# Patient Record
Sex: MALE | Race: WHITE | NOT HISPANIC OR LATINO | Employment: FULL TIME | URBAN - METROPOLITAN AREA
[De-identification: names, ages, dates, MRNs, and addresses within clinical notes are randomized per-mention and may not be internally consistent; named-entity substitution may affect disease eponyms.]

---

## 2017-01-11 ENCOUNTER — TELEPHONE (OUTPATIENT)
Dept: FAMILY MEDICINE | Facility: OTHER | Age: 52
End: 2017-01-11

## 2017-01-11 NOTE — Clinical Note
[address]   [facility] [address]  Phone: [phone]  January 16, 2017      Jack Lenz  [address]  [facility] [address]      Dear Jack,    We care about your health and have reviewed your health plan including your medical conditions, medications, and lab results.  Based on this review, it is recommended that you follow up regarding the following health topic(s):  -Cholesterol  -High Blood Pressure  -Colon Cancer Screening    We recommend you take the following action(s):  -schedule a FREE FLOAT MA-ONLY BLOOD PRESSURE APPOINTMENT within the next 1-4 weeks.  -schedule a LAB ONLY APPOINTMENT to recheck your: Lipids (fasting cholesterol - nothing to eat except water and/or meds for 8-10 hours) and BMP (basic metabolic panel) within the next 1-4 weeks.  If' you have had labs completed eslewhere, please let us know so we can update your records.    -schedule a COLONOSCOPY to look for colon cancer (due every 10 years or 5 years in higher risk situations.)  Colonoscopies can prevent 90-95% of colon cancer deaths.  Problem lesions can be removed before they ever become cancer.  If you do not wish to do a colonoscopy or cannot afford to do one at this time, there is another option called a Fecal Immunochemical Occult Blood Test (FIT) a take home stool sample kit.  It does not replace the colonoscopy for colorectal cancer screening, but it can detect hidden bleeding in the lower colon.  It does need to be repeated every year and if a positive result is obtained, you would be referred for a colonoscopy.  If you have completed either one of these tests at another facility, please have the records sent to our clinic for our records.     Please call us at the [facility] - [phone] (or use [employer]) to address the above recommendations.     Thank you for trusting [facility] and we appreciate the opportunity to serve you.  We look forward to  supporting your healthcare needs in the future.    Healthy Regards,    Your Health Care Team  [facility]

## 2017-01-11 NOTE — TELEPHONE ENCOUNTER
Summary:    Patient is due/failing the following:   BMP, BP CHECK, COLONOSCOPY and LDL    Action needed:   Patient needs fasting lab only appointment, Patient needs nurse only appointment. and schedule a colonoscopy     Type of outreach:    Phone, left message for patient to call back.     Questions for provider review:    None                                                                                                                                    Brianna Foster       Chart routed to Care Team .        Panel Management Review      Patient has the following on his problem list:     Hypertension   Last three blood pressure readings:  BP Readings from Last 3 Encounters:   08/01/16 138/94   06/02/15 130/88   04/14/14 134/82     Blood pressure: Failed    HTN Guidelines:  Age 18-59 BP range:  Less than 140/90  Age 60-85 with Diabetes:  Less than 140/90  Age 60-85 without Diabetes:  less than 150/90      Composite cancer screening  Chart review shows that this patient is due/due soon for the following Colonoscopy

## 2017-05-09 ENCOUNTER — TELEPHONE (OUTPATIENT)
Dept: FAMILY MEDICINE | Facility: OTHER | Age: 52
End: 2017-05-09

## 2017-05-09 NOTE — LETTER
[address]   [facility] [address]  Phone: [phone]  May 31, 2017      Jack Lenz  [address]  [facility] [address]      Dear Jack,    We care about your health and have reviewed your health plan including your medical conditions, medications, and lab results.  Based on this review, it is recommended that you follow up regarding the following health topic(s):  -Cholesterol  -High Blood Pressure  -Colon Cancer Screening    We recommend you take the following action(s):  -schedule a FREE FLOAT MA-ONLY BLOOD PRESSURE APPOINTMENT within the next 1-4 weeks.  -schedule a LAB ONLY APPOINTMENT to recheck your: Lipids (fasting cholesterol - nothing to eat except water and/or meds for 8-10 hours) and BMP (basic metabolic panel) within the next 1-4 weeks.  If' you have had labs completed eslewhere, please let us know so we can update your records.    -schedule a COLONOSCOPY to look for colon cancer (due every 10 years or 5 years in higher risk situations.)  Colonoscopies can prevent 90-95% of colon cancer deaths.  Problem lesions can be removed before they ever become cancer.  If you do not wish to do a colonoscopy or cannot afford to do one at this time, there is another option called a Fecal Immunochemical Occult Blood Test (FIT) a take home stool sample kit.  It does not replace the colonoscopy for colorectal cancer screening, but it can detect hidden bleeding in the lower colon.  It does need to be repeated every year and if a positive result is obtained, you would be referred for a colonoscopy.  If you have completed either one of these tests at another facility, please have the records sent to our clinic for our records.     Please call us at the [facility] - [phone] (or use [employer]) to address the above recommendations.     Thank you for trusting [facility] and we appreciate the opportunity to serve you.  We look forward to  supporting your healthcare needs in the future.    Healthy Regards,    Your Health Care Team  [facility]

## 2017-05-09 NOTE — TELEPHONE ENCOUNTER
Summary:    Patient is due/failing the following:   BMP, BP CHECK, COLONOSCOPY and LDL    Action needed:   Patient needs fasting lab only appointment, Patient needs nurse only appointment. and schedule a colonoscopy or complete a FIT test     Type of outreach:    Phone, left message for patient to call back.     Questions for provider review:    None                                                                                                                                    Brianna Foster       Chart routed to Care Team .        Panel Management Review      Patient has the following on his problem list:     Hypertension   Last three blood pressure readings:  BP Readings from Last 3 Encounters:   08/01/16 (!) 138/94   06/02/15 130/88   04/14/14 134/82     Blood pressure: FAILED    HTN Guidelines:  Age 18-59 BP range:  Less than 140/90  Age 60-85 with Diabetes:  Less than 140/90  Age 60-85 without Diabetes:  less than 150/90      Composite cancer screening  Chart review shows that this patient is due/due soon for the following Colonoscopy

## 2017-08-08 ENCOUNTER — TELEPHONE (OUTPATIENT)
Dept: FAMILY MEDICINE | Facility: OTHER | Age: 52
End: 2017-08-08

## 2017-08-08 NOTE — LETTER
[address]   [facility] [address]  Phone: [phone]  August 23, 2017      Jack Lenz  [address]  [facility] [address]      Dear Jack,    We care about your health and have reviewed your health plan including your medical conditions, medications, and lab results.  Based on this review, it is recommended that you follow up regarding the following health topic(s):  Fasting lab only appointment, nurse only appointment for blood pressure and schedule colonoscopy     Please call us at the [facility] - [phone] (or use [employer]) to address the above recommendations.     Thank you for trusting [facility] and we appreciate the opportunity to serve you.  We look forward to supporting your healthcare needs in the future.    Healthy Regards,    Your Health Care Team  [facility]

## 2017-08-08 NOTE — TELEPHONE ENCOUNTER
Panel Management Review      Patient has the following on his problem list:     Hypertension   Last three blood pressure readings:  BP Readings from Last 3 Encounters:   08/01/16 (!) 138/94   06/02/15 130/88   04/14/14 134/82     Blood pressure: FAILED    HTN Guidelines:  Age 18-59 BP range:  Less than 140/90  Age 60-85 with Diabetes:  Less than 140/90  Age 60-85 without Diabetes:  less than 150/90        Composite cancer screening  Chart review shows that this patient is due/due soon for the following Colonoscopy  Summary:    Patient is due/failing the following:   BMP, BP CHECK, PSA, COLONOSCOPY and LDL    Action needed:   Patient needs fasting lab only appointment, Patient needs nurse only appointment. and schedule colonoscopy    Type of outreach:    Phone, left message for patient to call back.     Questions for provider review:    None                                                                                                                                    Jazmin Robison CMA     Chart routed to Care Team .

## 2017-08-15 NOTE — TELEPHONE ENCOUNTER
Attempted to reach the patient with the following results:  Left message on voicemail for the patient to call back.   Jenni Monk MA

## 2017-09-12 ENCOUNTER — TELEPHONE (OUTPATIENT)
Dept: FAMILY MEDICINE | Facility: OTHER | Age: 52
End: 2017-09-12

## 2017-09-12 DIAGNOSIS — I10 ESSENTIAL HYPERTENSION WITH GOAL BLOOD PRESSURE LESS THAN 140/90: ICD-10-CM

## 2017-09-12 NOTE — TELEPHONE ENCOUNTER
hydrochlorothiazide (HYDRODIURIL) 25 MG tablet      Last Written Prescription Date: 8/1/16  Last Fill Quantity: 90, # refills: 3  Last Office Visit with [facility] or  Health prescribing provider: 8/1/16 chantale       Potassium   Date Value Ref Range Status   06/02/2015 4.1 3.4 - 5.3 mmol/L Final     Creatinine   Date Value Ref Range Status   06/02/2015 0.85 0.66 - 1.25 mg/dL Final     BP Readings from Last 3 Encounters:   08/01/16 (!) 138/94   06/02/15 130/88   04/14/14 134/82     lisinopril (PRINIVIL,ZESTRIL) 10 MG tablet      Last Written Prescription Date: 8/1/16  Last Fill Quantity: 90, # refills: 3  Last Office Visit with [facility] or  Health prescribing provider: 8/1/16 chantale       Potassium   Date Value Ref Range Status   06/02/2015 4.1 3.4 - 5.3 mmol/L Final     Creatinine   Date Value Ref Range Status   06/02/2015 0.85 0.66 - 1.25 mg/dL Final     BP Readings from Last 3 Encounters:   08/01/16 (!) 138/94   06/02/15 130/88   04/14/14 134/82     simvastatin (ZOCOR) 20 MG tablet     Last Written Prescription Date: 8/1/16  Last Fill Quantity: 90, # refills: 3  Last Office Visit with [facility] or  Health prescribing provider: 8/1/16 chantale       Lab Results   Component Value Date    CHOL 171 06/02/2015     Lab Results   Component Value Date    HDL 46 06/02/2015     Lab Results   Component Value Date    LDL 89 06/02/2015     Lab Results   Component Value Date    TRIG 180 06/02/2015     Lab Results   Component Value Date    CHOLHDLRATIO 3.7 06/02/2015

## 2017-09-13 RX ORDER — SIMVASTATIN 20 MG
TABLET ORAL
Qty: 30 TABLET | Refills: 0 | Status: SHIPPED | OUTPATIENT
Start: 2017-09-13 | End: 2017-11-06

## 2017-09-13 RX ORDER — HYDROCHLOROTHIAZIDE 25 MG/1
TABLET ORAL
Qty: 30 TABLET | Refills: 0 | Status: SHIPPED | OUTPATIENT
Start: 2017-09-13 | End: 2017-11-06

## 2017-09-13 RX ORDER — LISINOPRIL 10 MG/1
TABLET ORAL
Qty: 30 TABLET | Refills: 0 | Status: SHIPPED | OUTPATIENT
Start: 2017-09-13 | End: 2017-11-06

## 2017-09-13 NOTE — TELEPHONE ENCOUNTER
Routing refill request to provider for review/approval because:  Patient needs to be seen because it has been more than 1 year since last office visit.  Appears to be a break in medication - should have been out around 8/1/17    Gaby Roy, RN, BSN

## 2017-09-18 NOTE — TELEPHONE ENCOUNTER
Lm informing patient of message below. That refills have been seen but will need an office visit before next fill.

## 2017-10-20 ENCOUNTER — TELEPHONE (OUTPATIENT)
Dept: FAMILY MEDICINE | Facility: OTHER | Age: 52
End: 2017-10-20

## 2017-10-20 NOTE — TELEPHONE ENCOUNTER
Panel Management Review      Patient has the following on his problem list:     Hypertension   Last three blood pressure readings:  BP Readings from Last 3 Encounters:   08/01/16 (!) 138/94   06/02/15 130/88   04/14/14 134/82     Blood pressure: Passed    HTN Guidelines:  Age 18-59 BP range:  Less than 140/90  Age 60-85 with Diabetes:  Less than 140/90  Age 60-85 without Diabetes:  less than 150/90        Composite cancer screening  Chart review shows that this patient is due/due soon for the following Colonoscopy  Summary:    Patient is due/failing the following:   BMP, COLONOSCOPY and LDL    Action needed:   Patient needs office visit for physical.    Type of outreach:    Phone, spoke to patient.  patient will call to schedule yearly physical    Questions for provider review:    None                                                                                                                                    April MILENA Adamson       Chart routed to Care Team .

## 2017-11-06 ENCOUNTER — OFFICE VISIT (OUTPATIENT)
Dept: FAMILY MEDICINE | Facility: OTHER | Age: 52
End: 2017-11-06
Payer: COMMERCIAL

## 2017-11-06 VITALS
OXYGEN SATURATION: 97 % | TEMPERATURE: 97.1 F | HEART RATE: 69 BPM | HEIGHT: 70 IN | BODY MASS INDEX: 37.8 KG/M2 | WEIGHT: 264 LBS | SYSTOLIC BLOOD PRESSURE: 140 MMHG | DIASTOLIC BLOOD PRESSURE: 92 MMHG | RESPIRATION RATE: 16 BRPM

## 2017-11-06 DIAGNOSIS — E78.5 HYPERLIPIDEMIA LDL GOAL <130: ICD-10-CM

## 2017-11-06 DIAGNOSIS — I10 HYPERTENSION GOAL BP (BLOOD PRESSURE) < 140/90: ICD-10-CM

## 2017-11-06 DIAGNOSIS — Z12.11 COLON CANCER SCREENING: ICD-10-CM

## 2017-11-06 DIAGNOSIS — J30.1 CHRONIC SEASONAL ALLERGIC RHINITIS DUE TO POLLEN: ICD-10-CM

## 2017-11-06 DIAGNOSIS — Z12.5 SCREENING FOR PROSTATE CANCER: ICD-10-CM

## 2017-11-06 DIAGNOSIS — Z00.00 ENCOUNTER FOR ROUTINE ADULT HEALTH EXAMINATION WITHOUT ABNORMAL FINDINGS: Primary | ICD-10-CM

## 2017-11-06 DIAGNOSIS — E66.09 CLASS 2 OBESITY DUE TO EXCESS CALORIES WITHOUT SERIOUS COMORBIDITY WITH BODY MASS INDEX (BMI) OF 37.0 TO 37.9 IN ADULT: ICD-10-CM

## 2017-11-06 DIAGNOSIS — E55.9 VITAMIN D DEFICIENCY: ICD-10-CM

## 2017-11-06 DIAGNOSIS — M17.11 PRIMARY OSTEOARTHRITIS OF RIGHT KNEE: ICD-10-CM

## 2017-11-06 DIAGNOSIS — L57.0 ACTINIC KERATOSES: ICD-10-CM

## 2017-11-06 DIAGNOSIS — E66.812 CLASS 2 OBESITY DUE TO EXCESS CALORIES WITHOUT SERIOUS COMORBIDITY WITH BODY MASS INDEX (BMI) OF 37.0 TO 37.9 IN ADULT: ICD-10-CM

## 2017-11-06 DIAGNOSIS — H93.13 TINNITUS, BILATERAL: ICD-10-CM

## 2017-11-06 LAB
ALBUMIN SERPL-MCNC: 4.3 G/DL (ref 3.4–5)
ALP SERPL-CCNC: 75 U/L (ref 40–150)
ALT SERPL W P-5'-P-CCNC: 46 U/L (ref 0–70)
ANION GAP SERPL CALCULATED.3IONS-SCNC: 8 MMOL/L (ref 3–14)
AST SERPL W P-5'-P-CCNC: 20 U/L (ref 0–45)
BILIRUB SERPL-MCNC: 0.6 MG/DL (ref 0.2–1.3)
BUN SERPL-MCNC: 14 MG/DL (ref 7–30)
CALCIUM SERPL-MCNC: 9.1 MG/DL (ref 8.5–10.1)
CHLORIDE SERPL-SCNC: 105 MMOL/L (ref 94–109)
CO2 SERPL-SCNC: 27 MMOL/L (ref 20–32)
CREAT SERPL-MCNC: 0.72 MG/DL (ref 0.66–1.25)
DEPRECATED CALCIDIOL+CALCIFEROL SERPL-MC: 32 UG/L (ref 20–75)
GFR SERPL CREATININE-BSD FRML MDRD: >90 ML/MIN/1.7M2
GLUCOSE SERPL-MCNC: 105 MG/DL (ref 70–99)
POTASSIUM SERPL-SCNC: 4.3 MMOL/L (ref 3.4–5.3)
PROT SERPL-MCNC: 7.2 G/DL (ref 6.8–8.8)
PSA SERPL-ACNC: 2.53 UG/L (ref 0–4)
SODIUM SERPL-SCNC: 140 MMOL/L (ref 133–144)

## 2017-11-06 PROCEDURE — 80053 COMPREHEN METABOLIC PANEL: CPT | Performed by: PHYSICIAN ASSISTANT

## 2017-11-06 PROCEDURE — 99396 PREV VISIT EST AGE 40-64: CPT | Performed by: PHYSICIAN ASSISTANT

## 2017-11-06 PROCEDURE — G0103 PSA SCREENING: HCPCS | Performed by: PHYSICIAN ASSISTANT

## 2017-11-06 PROCEDURE — 82306 VITAMIN D 25 HYDROXY: CPT | Performed by: PHYSICIAN ASSISTANT

## 2017-11-06 PROCEDURE — 36415 COLL VENOUS BLD VENIPUNCTURE: CPT | Performed by: PHYSICIAN ASSISTANT

## 2017-11-06 PROCEDURE — 99213 OFFICE O/P EST LOW 20 MIN: CPT | Mod: 25 | Performed by: PHYSICIAN ASSISTANT

## 2017-11-06 RX ORDER — HYDROCHLOROTHIAZIDE 25 MG/1
TABLET ORAL
Qty: 90 TABLET | Refills: 3 | Status: SHIPPED | OUTPATIENT
Start: 2017-11-06 | End: 2018-11-07

## 2017-11-06 RX ORDER — FLUOROURACIL 50 MG/G
CREAM TOPICAL 2 TIMES DAILY
Qty: 40 G | Refills: 0 | Status: SHIPPED | OUTPATIENT
Start: 2017-11-06 | End: 2019-12-06

## 2017-11-06 RX ORDER — LISINOPRIL 10 MG/1
TABLET ORAL
Qty: 90 TABLET | Refills: 3 | Status: SHIPPED | OUTPATIENT
Start: 2017-11-06 | End: 2019-12-06

## 2017-11-06 RX ORDER — FLUTICASONE PROPIONATE 50 MCG
1-2 SPRAY, SUSPENSION (ML) NASAL DAILY
Qty: 1 BOTTLE | Refills: 1 | Status: SHIPPED | OUTPATIENT
Start: 2017-11-06 | End: 2018-11-07

## 2017-11-06 RX ORDER — SIMVASTATIN 20 MG
TABLET ORAL
Qty: 90 TABLET | Refills: 3 | Status: SHIPPED | OUTPATIENT
Start: 2017-11-06 | End: 2018-11-07

## 2017-11-06 ASSESSMENT — ENCOUNTER SYMPTOMS
ARTHRALGIAS: 0
FREQUENCY: 0
WEAKNESS: 0
RESPIRATORY NEGATIVE: 1
PSYCHIATRIC NEGATIVE: 1
DYSURIA: 0
HEMATOLOGIC/LYMPHATIC NEGATIVE: 1
EYES NEGATIVE: 1
ABDOMINAL PAIN: 0
CHILLS: 0
HEARTBURN: 0
CONSTIPATION: 0
HEMATOCHEZIA: 0
SORE THROAT: 0
NERVOUS/ANXIOUS: 0
PALPITATIONS: 0
PARESTHESIAS: 0
DIZZINESS: 0
EYE PAIN: 0
COUGH: 0
CONSTITUTIONAL NEGATIVE: 1
FEVER: 0
NEUROLOGICAL NEGATIVE: 1
ENDOCRINE NEGATIVE: 1
ALLERGIC/IMMUNOLOGIC NEGATIVE: 1
GASTROINTESTINAL NEGATIVE: 1
NAUSEA: 0
JOINT SWELLING: 1
HEMATURIA: 0
MYALGIAS: 0
CARDIOVASCULAR NEGATIVE: 1
DIARRHEA: 0
HEADACHES: 0
SHORTNESS OF BREATH: 0

## 2017-11-06 ASSESSMENT — PAIN SCALES - GENERAL: PAINLEVEL: NO PAIN (1)

## 2017-11-06 NOTE — PROGRESS NOTES
"SUBJECTIVE:   CC: Jack Lenz is an 52 year old male who presents for preventative health visit.     Physical   Annual:     Getting at least 3 servings of Calcium per day::  Yes    Bi-annual eye exam::  Yes    Dental care twice a year::  Yes    Sleep apnea or symptoms of sleep apnea::  None    Frequency of exercise::  1 day/week    Duration of exercise::  15-30 minutes    Taking medications regularly::  Yes    Medication side effects::  None    Additional concerns today::  YES (Patient states \"it sounds like an engine is running in both ears.\" States it has been going on for \"quite a while.\")  He states \"when he relaxes, all of the sudden he can hear it.\" This has been going on for a few years and he denies any worsening symptoms but has just noticed it. He states he has an allergy to ragweed and dust causing congestion and thinks this is related to that. He takes an antihistamine as needed which seems to help.     He states he has arthritis in right knee. It is a 1/10 when sitting, and a 2/10 when standing. He states it has improved but still \"pops\" at times. Pain is over the medial knee and worse when going down stairs and playing sports/jogging. He has tried ice, naproxen and compression which helps. It has been occurring for the past 6 months. He has tweaked it a few times fixing his car and playing basketball but denies an initial injury or fall. He saw an orthopedic surgeon at [facility] in August and had an x-ray that revealed arthritis. She had recommended conservative measures but told him an MRI or injections may be warranted if symptoms did not improve. He does states the pain seems to be improving.       He has been out of his hydrochlorithiazide for the past few weeks.    He has white, rough spots on his legs that have been there for quite awhile and they seem to come ago, appearing on his hands at times as well.     Today's PHQ-2 Score:   PHQ-2 ( 1999 [employer]) 11/6/2017   Q1: Little interest or pleasure in " doing things 0   Q2: Feeling down, depressed or hopeless 0   PHQ-2 Score 0   Q1: Little interest or pleasure in doing things Not at all   Q2: Feeling down, depressed or hopeless Not at all   PHQ-2 Score 0     Answers for HPI/ROS submitted by the patient on 11/6/2017   PHQ-2 Score: 0    Abuse: Current or Past(Physical, Sexual or Emotional)- No  Do you feel safe in your environment - Yes    Social History   Substance Use Topics     Smoking status: Never Smoker     Smokeless tobacco: Former User     Types: Chew     Alcohol use Yes      Comment: 3-5/week     The patient does not drink >3 drinks per day nor >7 drinks per week.    Last PSA: No results found for: PSA    Reviewed orders with patient. Reviewed health maintenance and updated orders accordingly - Yes    Reviewed and updated as needed this visit by clinical staff  Tobacco  Allergies  Med Hx  Surg Hx  Fam Hx  Soc Hx      Reviewed and updated as needed this visit by Provider        Review of Systems   Constitutional: Negative.  Negative for chills and fever.   HENT: Negative.  Negative for congestion, ear pain, hearing loss and sore throat.    Eyes: Negative.  Negative for pain and visual disturbance.   Respiratory: Negative.  Negative for cough and shortness of breath.    Cardiovascular: Negative.  Negative for chest pain, palpitations and peripheral edema.   Gastrointestinal: Negative.  Negative for abdominal pain, constipation, diarrhea, heartburn, hematochezia and nausea.   Endocrine: Negative.    Genitourinary: Negative.  Negative for discharge, dysuria, frequency, genital sores, hematuria, impotence and urgency.   Musculoskeletal: Positive for joint swelling. Negative for arthralgias and myalgias.   Skin: Negative.  Negative for rash.   Allergic/Immunologic: Negative.    Neurological: Negative.  Negative for dizziness, weakness, headaches and paresthesias.   Hematological: Negative.    Psychiatric/Behavioral: Negative.  Negative for mood changes. The  "patient is not nervous/anxious.        OBJECTIVE:   BP (!) 140/92  Pulse 69  Temp 97.1  F (36.2  C) (Temporal)  Resp 16  Ht 5' 10.28\" (1.785 m)  Wt 264 lb (119.7 kg)  SpO2 97%  BMI 37.58 kg/m2    Physical Exam  GENERAL: healthy, alert and no distress  EYES: Eyes grossly normal to inspection, PERRL and conjunctivae and sclerae normal  HENT: ear canals and TM's normal, nose and mouth without ulcers or lesions  NECK: no adenopathy, no asymmetry, masses, or scars and thyroid normal to palpation  RESP: lungs clear to auscultation - no rales, rhonchi or wheezes  CV: regular rate and rhythm, normal S1 S2, no S3 or S4, no murmur, click or rub, no peripheral edema and peripheral pulses strong  ABDOMEN: soft, nontender, no hepatosplenomegaly, no masses and bowel sounds normal   (male): normal male circumcised genitalia without lesions or urethral discharge, no hernia  MS: +Soft tissue effusion superior and lateral to right patella. Tenderess over the medial joint line. Full knee range of motion with negative anterior/posterior drawer, varus/valgus stress and medial/lateral Elisa's.   SKIN: Numerous round, rough, whitish lesions over the bilateral lower and upper legs.   NEURO: Normal strength and tone, mentation intact and speech normal. Cranial nerves II-XII are grossly intact. DTRs are 2+/4 throughout and symmetric. Gait is stable.   PSYCH: mentation appears normal, affect normal/bright      XR Knee 8/31/17 [facility]    FINDINGS:  Standing view shows medial compartment narrowing without significant degenerative change. Remaining portions of the exam are normal.    ASSESSMENT/PLAN:       ICD-10-CM    1. Encounter for routine adult health examination without abnormal findings Z00.00    2. Hypertension goal BP (blood pressure) < 140/90 I10 Comprehensive metabolic panel (BMP + Alb, Alk Phos, ALT, AST, Total. Bili, TP)     lisinopril (PRINIVIL/ZESTRIL) 10 MG tablet     hydrochlorothiazide (HYDRODIURIL) 25 MG " tablet     OFFICE/OUTPT VISIT,EST,LEVL III   3. Hyperlipidemia LDL goal <130 E78.5 Lipid panel reflex to direct LDL Fasting     simvastatin (ZOCOR) 20 MG tablet   4. Class 2 obesity due to excess calories without serious comorbidity with body mass index (BMI) of 37.0 to 37.9 in adult E66.09     Z68.37    5. Actinic keratoses L57.0 fluorouracil (EFUDEX) 5 % cream     OFFICE/OUTPT VISIT,EST,LEVL III   6. Chronic seasonal allergic rhinitis due to pollen J30.1 fluticasone (FLONASE) 50 MCG/ACT spray     OFFICE/OUTPT VISIT,EST,LEVL III   7. Vitamin D deficiency E55.9 Vitamin D Deficiency   8. Primary osteoarthritis of right knee M17.11 OFFICE/OUTPT VISIT,EST,LEVL III   9. Tinnitus, bilateral H93.13 OFFICE/OUTPT VISIT,EST,LEVL III   10. Colon cancer screening Z12.11 GASTROENTEROLOGY ADULT REF PROCEDURE ONLY   11. Screening for prostate cancer Z12.5 PSA, screen         2, 4. BP is elevated during both readings today but he has been off of his hydrochlorithiazide for a few months so will restart this and continue with current dose of lisinopril. I recommend he follow up in 1 week for recheck BP with the nurses. I also discussed the importance of a healthier diet with limited salt as well as a increased exercise. This will help with weight loss and with his blood pressure.    3. Will order updated fasting labs today and refill simvastatin.    5. Multiple AKs over bilateral legs that are too numerous to treat with cryotherapy. I discussed the pre-cancerous nature of these lesions and recommend treatment with Efudex 5% cream twice daily for 2 weeks. I discussed common side effects from this medications including skin burning, peeling, and redness. If he does not tolerate twice daily application, he can go down to once daily. The lesions can take 1-2 months to completely go away after treatment and if they do not resolve, he will let me know.     6, 9. I believe his tinnitus is do to chronic seasonal allergic rhinitis. I  "recommend he start a daily antihistamine as well as daily Flonase nasal spray. If this is not helping, he can try lipo-flavanoid which has been shown to be effective for tinnitus treatment.    7. He has a history of vitamin D deficiency and states he takes 2000 units daily and wants his levels checked today.    8. Left knee pain consistent with OA, especially based on the x-ray findings at [facility]. No obvious meniscal or ligamentous pathology on exam and symptoms seem to be improving. I recommend NSAIDs, ice, heat, and elevation. If symptoms worsen, he will follow up with the orthopedic specialist.    10. Colonooscopy ordered.    11. PSA ordered.    Follow up annually if all labs are stable.      COUNSELING:   Reviewed preventive health counseling, as reflected in patient instructions       Regular exercise       Healthy diet/nutrition       Vision screening    BP Screening:   Last 3 BP Readings:    BP Readings from Last 3 Encounters:   11/06/17 (!) 140/92   08/01/16 (!) 138/94   06/02/15 130/88        reports that he has never smoked. He has quit using smokeless tobacco. His smokeless tobacco use included Chew.      Estimated body mass index is 37.58 kg/(m^2) as calculated from the following:    Height as of this encounter: 5' 10.28\" (1.785 m).    Weight as of this encounter: 264 lb (119.7 kg).   Weight management plan: Discussed healthy diet and exercise guidelines and patient will follow up in 12 months in clinic to re-evaluate.    Counseling Resources:  ATP IV Guidelines  Pooled Cohorts Equation Calculator  FRAX Risk Assessment  ICSI Preventive Guidelines  Dietary Guidelines for Americans, 2010  USDA's MyPlate  ASA Prophylaxis  Lung CA Screening    Henrry Hameed PA-C  [facility]  "

## 2017-11-06 NOTE — NURSING NOTE
"Chief Complaint   Patient presents with     Physical       Initial BP (!) 144/96 (BP Location: Right arm, Patient Position: Chair, Cuff Size: Adult Regular)  Pulse 69  Temp 97.1  F (36.2  C) (Temporal)  Resp 16  Ht 5' 10.28\" (1.785 m)  Wt 264 lb (119.7 kg)  SpO2 97%  BMI 37.58 kg/m2 Estimated body mass index is 37.58 kg/(m^2) as calculated from the following:    Height as of this encounter: 5' 10.28\" (1.785 m).    Weight as of this encounter: 264 lb (119.7 kg).  Medication Reconciliation: complete     Deepa Helm, MILENA      "

## 2017-11-06 NOTE — MR AVS SNAPSHOT
After Visit Summary   11/6/2017    Jack Lenz    MRN: [id number]           Patient Information     Date Of Birth          1965        Visit Information        Provider Department      11/6/2017 8:00 AM Henrry Hameed PA-C [facility]        Today's Diagnoses     Encounter for routine adult health examination without abnormal findings    -  1    Hypertension goal BP (blood pressure) < 140/90        Hyperlipidemia LDL goal <130        Actinic keratoses        Acute seasonal allergic rhinitis due to pollen        Vitamin D deficiency        Primary osteoarthritis of right knee        Tinnitus, bilateral        Colon cancer screening        Screening for prostate cancer          Care Instructions      Preventive Health Recommendations  Male Ages 50 - 64    Yearly exam:             See your health care provider every year in order to  o   Review health changes.   o   Discuss preventive care.    o   Review your medicines if your doctor has prescribed any.     Have a cholesterol test every 5 years, or more frequently if you are at risk for high cholesterol/heart disease.     Have a diabetes test (fasting glucose) every three years. If you are at risk for diabetes, you should have this test more often.     Have a colonoscopy at age 50, or have a yearly FIT test (stool test). These exams will check for colon cancer.      Talk with your health care provider about whether or not a prostate cancer screening test (PSA) is right for you.    You should be tested each year for STDs (sexually transmitted diseases), if you re at risk.     Shots: Get a flu shot each year. Get a tetanus shot every 10 years.     Nutrition:    Eat at least 5 servings of fruits and vegetables daily.     Eat whole-grain bread, whole-wheat pasta and brown rice instead of white grains and rice.     Talk to your provider about Calcium and Vitamin D.     Lifestyle    Exercise for at least 150 minutes a week (30  minutes a day, 5 days a week). This will help you control your weight and prevent disease.     Limit alcohol to one drink per day.     No smoking.     Wear sunscreen to prevent skin cancer.     See your dentist every six months for an exam and cleaning.     See your eye doctor every 1 to 2 years.      Will place you on a cream to use a thin film twice daily over the legs for 2 weeks. If this causes too much irritation, you can cut back to once daily.  The lesions can take 1-2 months to go away. If not helping, let me know.     Continue with ice, heat, elevation, compression and ibuprofen/Aleve for the knee pain.  If pain worsens, you should follow up with the orthopedic specialist to discuss possible injections.  Continue with home stretching.    The ear ringing is likely due to allergies and congestion.  Try a daily antihistamine like Claritin or Zyrtec and also the Flonase nasal spray.  If not improving, you can also try lipo-flavanoid.    Continue with current blood pressure medications.  Follow up in 1 week to recheck blood pressure.    They will call you to schedule a colonoscopy.              Follow-ups after your visit        Additional Services     GASTROENTEROLOGY ADULT REF PROCEDURE ONLY       Last Lab Result: Creatinine (mg/dL)       Date                     Value                 06/02/2015               0.85             ----------  Body mass index is 37.58 kg/(m^2).     Needed:  No  Language:  English    Patient will be contacted to schedule procedure.     Please be aware that coverage of these services is subject to the terms and limitations of your health insurance plan.  Call member services at your health plan with any benefit or coverage questions.  Any procedures must be performed at a [facility] facility OR coordinated by your clinic's referral office.    Please bring the following with you to your appointment:    (1) Any X-Rays, CTs or MRIs which have been performed.  Contact the  "facility where they were done to arrange for  prior to your scheduled appointment.    (2) List of current medications   (3) This referral request   (4) Any documents/labs given to you for this referral                  Future tests that were ordered for you today     Open Future Orders        Priority Expected Expires Ordered    Lipid panel reflex to direct LDL Fasting Routine 2017            Who to contact     If you have questions or need follow up information about today's clinic visit or your schedule please contact [facility] directly at [phone].  Normal or non-critical lab and imaging results will be communicated to you by S[employer]lasshart, letter or phone within 4 business days after the clinic has received the results. If you do not hear from us within 7 days, please contact the clinic through S[employer]lass[employer] or phone. If you have a critical or abnormal lab result, we will notify you by phone as soon as possible.  Submit refill requests through [employer] or call your pharmacy and they will forward the refill request to us. Please allow 3 business days for your refill to be completed.          Additional Information About Your Visit        Sunglassharfrenting Information     [employer] lets you send messages to your doctor, view your test results, renew your prescriptions, schedule appointments and more. To sign up, go to [email or web address] . Click on \"Log in\" on the left side of the screen, which will take you to the Welcome page. Then click on \"Sign up Now\" on the right side of the page.     You will be asked to enter the access code listed below, as well as some personal information. Please follow the directions to create your username and password.     Your access code is: [email or web address]  Expires: 2018  8:48 AM     Your access code will  in 90 days. If you need help or a new code, please call your [facility] or [phone].        Care EveryWhere ID     This is your " "Care EveryWhere ID. This could be used by other organizations to access your [facility] medical records  [id number]        Your Vitals Were     Pulse Temperature Respirations Height Pulse Oximetry BMI (Body Mass Index)    69 97.1  F (36.2  C) (Temporal) 16 5' 10.28\" (1.785 m) 97% 37.58 kg/m2       Blood Pressure from Last 3 Encounters:   11/06/17 (!) 140/92   08/01/16 (!) 138/94   06/02/15 130/88    Weight from Last 3 Encounters:   11/06/17 264 lb (119.7 kg)   08/01/16 255 lb (115.7 kg)   04/14/14 247 lb (112 kg)              We Performed the Following     Comprehensive metabolic panel (BMP + Alb, Alk Phos, ALT, AST, Total. Bili, TP)     GASTROENTEROLOGY ADULT REF PROCEDURE ONLY     PSA, screen     Vitamin D Deficiency          Today's Medication Changes          These changes are accurate as of: 11/6/17  8:48 AM.  If you have any questions, ask your nurse or doctor.               Start taking these medicines.        Dose/Directions    fluorouracil 5 % cream   Commonly known as:  EFUDEX   Used for:  Actinic keratoses   Started by:  Henrry Hameed PA-C        Apply topically 2 times daily For 2 weeks   Quantity:  40 g   Refills:  0       fluticasone 50 MCG/ACT spray   Commonly known as:  FLONASE   Used for:  Acute seasonal allergic rhinitis due to pollen   Started by:  Henrry Hameed PA-C        Dose:  1-2 spray   Spray 1-2 sprays into both nostrils daily   Quantity:  1 Bottle   Refills:  1         These medicines have changed or have updated prescriptions.        Dose/Directions    hydrochlorothiazide 25 MG tablet   Commonly known as:  HYDRODIURIL   This may have changed:  See the new instructions.   Used for:  Hypertension goal BP (blood pressure) < 140/90   Changed by:  Henrry Hameed PA-C        TAKE 1 TAB BY MOUTH DAILY   Quantity:  90 tablet   Refills:  3       lisinopril 10 MG tablet   Commonly known as:  PRINIVIL/ZESTRIL   This may have changed:  See the new instructions.   Used for:  " Hypertension goal BP (blood pressure) < 140/90   Changed by:  Henrry Hameed PA-C        TAKE 1 TAB BY MOUTH DAILY   Quantity:  90 tablet   Refills:  3       simvastatin 20 MG tablet   Commonly known as:  ZOCOR   This may have changed:  See the new instructions.   Used for:  Hyperlipidemia LDL goal <130   Changed by:  Henrry Hameed PA-C        TAKE 1 TAB BY MOUTH DAILY   Quantity:  90 tablet   Refills:  3            Where to get your medicines      These medications were sent to [facility] [facility] - [place], [place] - [address]  [address], [facility] [address]     Phone:  [phone]     fluorouracil 5 % cream    fluticasone 50 MCG/ACT spray    hydrochlorothiazide 25 MG tablet    lisinopril 10 MG tablet    simvastatin 20 MG tablet                Primary Care Provider Office Phone # Fax #    Henrry Hameed PA-C [phone]       [address]    [facility] [address]        Equal Access to Services     [facility] : Hadii mery ku hadasho Soomaali, waaxda luqadaha, qaybta kaalmada adeegyada, waxay idiin hayfernien renetta blanco . So [facility] [phone].    ATENCIÓN: Si habla español, tiene a arias disposición servicios gratuitos de asistencia lingüística. Llame al [phone].    We comply with applicable federal civil rights laws and [place] laws. We do not discriminate on the basis of race, color, national origin, age, disability, sex, sexual orientation, or gender identity.            Thank you!     Thank you for choosing [facility]  for your care. Our goal is always to provide you with excellent care. Hearing back from our patients is one way we can continue to improve our services. Please take a few minutes to complete the written survey that you may receive in the mail after your visit with us. Thank you!             Your Updated Medication List - Protect others around you: Learn how to safely use, store and throw away your medicines at  www.disposemymeds.org.          This list is accurate as of: 11/6/17  8:48 AM.  Always use your most recent med list.                   Brand Name Dispense Instructions for use Diagnosis    fluorouracil 5 % cream    EFUDEX    40 g    Apply topically 2 times daily For 2 weeks    Actinic keratoses       fluticasone 50 MCG/ACT spray    FLONASE    1 Bottle    Spray 1-2 sprays into both nostrils daily    Acute seasonal allergic rhinitis due to pollen       hydrochlorothiazide 25 MG tablet    HYDRODIURIL    90 tablet    TAKE 1 TAB BY MOUTH DAILY    Hypertension goal BP (blood pressure) < 140/90       IBUPROFEN PO      Take 400 mg by mouth.        lisinopril 10 MG tablet    PRINIVIL/ZESTRIL    90 tablet    TAKE 1 TAB BY MOUTH DAILY    Hypertension goal BP (blood pressure) < 140/90       simvastatin 20 MG tablet    ZOCOR    90 tablet    TAKE 1 TAB BY MOUTH DAILY    Hyperlipidemia LDL goal <130       TYLENOL PO      Take 325 mg by mouth every 4 hours as needed.

## 2017-11-06 NOTE — PATIENT INSTRUCTIONS
Preventive Health Recommendations  Male Ages 50   64    Yearly exam:             See your health care provider every year in order to  o   Review health changes.   o   Discuss preventive care.    o   Review your medicines if your doctor has prescribed any.     Have a cholesterol test every 5 years, or more frequently if you are at risk for high cholesterol/heart disease.     Have a diabetes test (fasting glucose) every three years. If you are at risk for diabetes, you should have this test more often.     Have a colonoscopy at age 50, or have a yearly FIT test (stool test). These exams will check for colon cancer.      Talk with your health care provider about whether or not a prostate cancer screening test (PSA) is right for you.    You should be tested each year for STDs (sexually transmitted diseases), if you re at risk.     Shots: Get a flu shot each year. Get a tetanus shot every 10 years.     Nutrition:    Eat at least 5 servings of fruits and vegetables daily.     Eat whole-grain bread, whole-wheat pasta and brown rice instead of white grains and rice.     Talk to your provider about Calcium and Vitamin D.     Lifestyle    Exercise for at least 150 minutes a week (30 minutes a day, 5 days a week). This will help you control your weight and prevent disease.     Limit alcohol to one drink per day.     No smoking.     Wear sunscreen to prevent skin cancer.     See your dentist every six months for an exam and cleaning.     See your eye doctor every 1 to 2 years.      Will place you on a cream to use a thin film twice daily over the legs for 2 weeks. If this causes too much irritation, you can cut back to once daily.  The lesions can take 1-2 months to go away. If not helping, let me know.     Continue with ice, heat, elevation, compression and ibuprofen/Aleve for the knee pain.  If pain worsens, you should follow up with the orthopedic specialist to discuss possible injections.  Continue with home  stretching.    The ear ringing is likely due to allergies and congestion.  Try a daily antihistamine like Claritin or Zyrtec and also the Flonase nasal spray.  If not improving, you can also try lipo-flavanoid.    Continue with current blood pressure medications.  Follow up in 1 week to recheck blood pressure.    They will call you to schedule a colonoscopy.

## 2017-11-08 ENCOUNTER — TELEPHONE (OUTPATIENT)
Dept: FAMILY MEDICINE | Facility: OTHER | Age: 52
End: 2017-11-08

## 2017-11-08 NOTE — LETTER
[facility]  [address]  [facility] [address]  [phone]        November 9, 2017    Jack Lenz  [address]  [facility] [address]          Jack,     Your glucose remains slightly elevated at 105 as we would like this below 100. Working on a healthier diet and routine exercise will help.     Your other labs including kidney function, liver function, electrolytes, PSA (prostate testing) and vitamin D are all normal.     Let me know if you have any questions.     Thanks!   Henrry Hameed PA-C/pk

## 2017-11-08 NOTE — TELEPHONE ENCOUNTER
----- Message from Henrry Hameed PA-C sent at 11/8/2017  7:06 AM CST -----  Jack,    Your glucose remains slightly elevated at 105 as we would like this below 100. Working on a healthier diet and routine exercise will help. Your other labs including kidney function, liver function, electrolytes, PSA (prostate testing) and vitamin D are all normal. Let me know if you have any questions. Thanks!    Henrry Hameed PA-C

## 2018-03-12 ENCOUNTER — TELEPHONE (OUTPATIENT)
Dept: FAMILY MEDICINE | Facility: OTHER | Age: 53
End: 2018-03-12

## 2018-03-12 NOTE — TELEPHONE ENCOUNTER
Summary:    Patient is due/failing the following:   BP CHECK, COLONOSCOPY and LDL    Action needed:   Patient needs fasting lab only appointment, Patient needs nurse only appointment. and schedule a colonoscopy or complete a FIT test     Type of outreach:    Phone, left message for patient to call back.     Questions for provider review:    None                                                                                                                                    Brianna Foster     Chart routed to Care Team .      Panel Management Review      Patient has the following on his problem list:     Hypertension   Last three blood pressure readings:  BP Readings from Last 3 Encounters:   11/06/17 (!) 140/92   08/01/16 (!) 138/94   06/02/15 130/88     Blood pressure: FAILED    HTN Guidelines:  Age 18-59 BP range:  Less than 140/90  Age 60-85 with Diabetes:  Less than 140/90  Age 60-85 without Diabetes:  less than 150/90      Composite cancer screening  Chart review shows that this patient is due/due soon for the following Colonoscopy and Fecal Colorectal (FIT)

## 2018-03-12 NOTE — LETTER
[address]   [facility] [address]  Phone: [phone]  March 23, 2018      Jack Lenz  [address]  [facility] [address]      Dear Jack,    We care about your health and have reviewed your health plan including your medical conditions, medications, and lab results.  Based on this review, it is recommended that you follow up regarding the following health topic(s):  -High Blood Pressure  -Colon Cancer Screening    We recommend you take the following action(s):  -schedule a FREE FLOAT MA-ONLY BLOOD PRESSURE APPOINTMENT within the next 1-4 weeks.  -schedule a COLONOSCOPY to look for colon cancer (due every 10 years or 5 years in higher risk situations.)  Colonoscopies can prevent 90-95% of colon cancer deaths.  Problem lesions can be removed before they ever become cancer.  If you do not wish to do a colonoscopy or cannot afford to do one at this time, there is another option called a Fecal Immunochemical Occult Blood Test (FIT) a take home stool sample kit.  It does not replace the colonoscopy for colorectal cancer screening, but it can detect hidden bleeding in the lower colon.  It does need to be repeated every year and if a positive result is obtained, you would be referred for a colonoscopy.  If you have completed either one of these tests at another facility, please have the records sent to our clinic for our records.     Please call us at the [facility] - [phone] (or use [employer]) to address the above recommendations.     Thank you for trusting [facility] and we appreciate the opportunity to serve you.  We look forward to supporting your healthcare needs in the future.    Healthy Regards,    Your Health Care Team  [facility]

## 2018-06-26 ENCOUNTER — TELEPHONE (OUTPATIENT)
Dept: FAMILY MEDICINE | Facility: OTHER | Age: 53
End: 2018-06-26

## 2018-06-26 NOTE — TELEPHONE ENCOUNTER
Summary:    Patient is due/failing the following:   COLONOSCOPY    Action needed:   Schedule a colonoscopy or complete a FIT test     Type of outreach:    Phone, left message for patient to call back.     Questions for provider review:    None                                                                                                                                    Brianna Foster       Chart routed to Care Team .        Panel Management Review      Patient has the following on his problem list:     Hypertension   Last three blood pressure readings:  BP Readings from Last 3 Encounters:   11/06/17 (!) 140/92   08/01/16 (!) 138/94   06/02/15 130/88     Blood pressure: FAILED    HTN Guidelines:  Age 18-59 BP range:  Less than 140/90  Age 60-85 with Diabetes:  Less than 140/90  Age 60-85 without Diabetes:  less than 150/90      Composite cancer screening  Chart review shows that this patient is due/due soon for the following Colonoscopy

## 2018-06-26 NOTE — LETTER
[facility]  [address]   [place] [address]  Phone: [phone]  July 30, 2018      Jack Lenz  [address]  [place] [address]      Dear Jack,    We care about your health and have reviewed your health plan including your medical conditions, medications, and lab results.  Based on this review, it is recommended that you follow up regarding the following health topic(s):  -Colon Cancer Screening    We recommend you take the following action(s):  -schedule a COLONOSCOPY to look for colon cancer (due every 10 years or 5 years in higher risk situations.)  Colonoscopies can prevent 90-95% of colon cancer deaths.  Problem lesions can be removed before they ever become cancer.  If you do not wish to do a colonoscopy or cannot afford to do one at this time, there is another option called a Fecal Immunochemical Occult Blood Test (FIT) a take home stool sample kit.  It does not replace the colonoscopy for colorectal cancer screening, but it can detect hidden bleeding in the lower colon.  It does need to be repeated every year and if a positive result is obtained, you would be referred for a colonoscopy.  If you have completed either one of these tests at another facility, please have the records sent to our clinic for our records.     Please call us at the [facility] - [phone] (or use [employer]) to address the above recommendations.     Thank you for trusting [facility] and we appreciate the opportunity to serve you.  We look forward to supporting your healthcare needs in the future.    Healthy Regards,    Your Health Care Team  [facility]

## 2018-11-01 NOTE — PROGRESS NOTES
"SUBJECTIVE:   CC: Jack Lenz is an 53 year old male who presents for preventative health visit.     Physical   Annual:     Getting at least 3 servings of Calcium per day:  Yes    Bi-annual eye exam:  NO    Dental care twice a year:  Yes    Sleep apnea or symptoms of sleep apnea:  None    Diet:  Regular (no restrictions)    Frequency of exercise:  1 day/week    Duration of exercise:  30-45 minutes    Taking medications regularly:  Yes    Medication side effects:  None    Additional concerns today:  No      He states he never really used the Efudex that was prescribed last year for the actinic keratoses covering his legs. He states they \"come and go\" and a lot of people in his family have them.    His bilateral knee pain comes and goes but has been better over the past year.    He has been taking a daily Zyrtec which has helped with his sneezing, congestion, and ringing in his ears.       Today's PHQ-2 Score:   PHQ-2 ( 1999 Pfizer) 11/7/2018   Q1: Little interest or pleasure in doing things 0   Q2: Feeling down, depressed or hopeless 0   PHQ-2 Score 0   Q1: Little interest or pleasure in doing things Not at all   Q2: Feeling down, depressed or hopeless Not at all   PHQ-2 Score 0     Answers for HPI/ROS submitted by the patient on 11/7/2018   PHQ-2 Score: 0    Abuse: Current or Past(Physical, Sexual or Emotional)- No  Do you feel safe in your environment - Yes    Social History   Substance Use Topics     Smoking status: Never Smoker     Smokeless tobacco: Former User     Types: Chew     Alcohol use Yes      Comment: 3-5/week     Alcohol Use 11/7/2018   If you drink alcohol do you typically have greater than 3 drinks per day OR greater than 7 drinks per week? No   No flowsheet data found.    Last PSA:   PSA   Date Value Ref Range Status   11/06/2017 2.53 0 - 4 ug/L Final     Comment:     Assay Method:  Chemiluminescence using Siemens Vista analyzer       Reviewed orders with patient. Reviewed health maintenance and " "updated orders accordingly - Yes    Reviewed and updated as needed this visit by clinical staff  Tobacco  Allergies  Meds  Problems  Med Hx  Surg Hx  Fam Hx  Soc Hx          Reviewed and updated as needed this visit by Provider  Allergies  Meds  Problems            Review of Systems   Constitutional: Negative.  Negative for chills and fever.   HENT: Negative.  Negative for congestion, ear pain, hearing loss and sore throat.    Eyes: Negative.  Negative for pain and visual disturbance.   Respiratory: Negative.  Negative for cough and shortness of breath.    Cardiovascular: Negative.  Negative for chest pain, palpitations and peripheral edema.   Gastrointestinal: Negative.  Negative for abdominal pain, constipation, diarrhea, heartburn, hematochezia and nausea.   Endocrine: Negative.    Genitourinary: Negative.  Negative for discharge, dysuria, frequency, genital sores, hematuria, impotence and urgency.   Musculoskeletal: Negative.  Negative for arthralgias, joint swelling and myalgias.   Skin: Negative.  Negative for rash.   Allergic/Immunologic: Negative.    Neurological: Negative.  Negative for dizziness, weakness, headaches and paresthesias.   Hematological: Negative.    Psychiatric/Behavioral: Negative.  Negative for mood changes. The patient is not nervous/anxious.      OBJECTIVE:   /82  Pulse 84  Temp 97  F (36.1  C) (Temporal)  Resp 18  Ht 5' 10.47\" (1.79 m)  Wt 266 lb (120.7 kg)  SpO2 97%  BMI 37.66 kg/m2    Physical Exam  GENERAL: healthy, alert and no distress  EYES: Eyes grossly normal to inspection, PERRL and conjunctivae and sclerae normal  HENT: ear canals and TM's normal, nose and mouth without ulcers or lesions  NECK: no adenopathy, no asymmetry, masses, or scars and thyroid normal to palpation  RESP: lungs clear to auscultation - no rales, rhonchi or wheezes  CV: regular rate and rhythm, normal S1 S2, no S3 or S4, no murmur, click or rub, no peripheral edema and peripheral " pulses strong  ABDOMEN: soft, nontender, no hepatosplenomegaly, no masses and bowel sounds normal   (male): normal male circumcised genitalia without lesions or urethral discharge, no hernia  MS: no gross musculoskeletal defects noted, no edema. FROM to all extremities. No spinal tenderness.   SKIN: Dozens of rough, white papules covering upper and lower legs bilaterally.   NEURO: Normal strength and tone, mentation intact and speech normal. Cranial nerves II-XII are grossly intact. DTRs are 2+/4 throughout and symmetric. Gait is stable.  PSYCH: mentation appears normal, affect normal/bright    ASSESSMENT/PLAN:       ICD-10-CM    1. Encounter for routine adult health examination without abnormal findings Z00.00    2. Hypertension goal BP (blood pressure) < 130/80 I10 BASIC METABOLIC PANEL     hydrochlorothiazide (HYDRODIURIL) 25 MG tablet     lisinopril (PRINIVIL/ZESTRIL) 20 MG tablet   3. Class 2 obesity due to excess calories without serious comorbidity with body mass index (BMI) of 37.0 to 37.9 in adult E66.09     Z68.37    4. Hypertriglyceridemia E78.1    5. Hyperlipidemia LDL goal <130 E78.5 Lipid panel reflex to direct LDL Fasting     simvastatin (ZOCOR) 20 MG tablet   6. Actinic keratosis L57.0    7. Screen for colon cancer Z12.11 Fecal colorectal cancer screen (FIT)       2-5. BP is above goal of <130/80 so I recommend we increase the dose of lisinopril to 20 mg daily. He will check his home blood pressure readings routinely and if consistently above 130/80, he will let me know. I also recommend a low salt, low fat diet with more routine exercise at least 4-5 days per week. Updated fasting labs completed today.    6. He still has dozens of AKs covering both legs. I again discussed the precancerous nature of these lesions and recommend he use the Efudex twice daily for 2 weeks to try to get rid of these. If they are not going away despite treatment, he will let me know and may need to send to dermatology.  "    7. He never scheduled a colonoscopy last year and does not really want to do it but has agreed to FIT testing.    Follow up annually.       COUNSELING:   Reviewed preventive health counseling, as reflected in patient instructions       Regular exercise       Healthy diet/nutrition    BP Readings from Last 1 Encounters:   11/07/18 136/82     Estimated body mass index is 37.66 kg/(m^2) as calculated from the following:    Height as of this encounter: 5' 10.47\" (1.79 m).    Weight as of this encounter: 266 lb (120.7 kg).    BP Screening:   Last 3 BP Readings:    BP Readings from Last 3 Encounters:   11/07/18 136/82   11/06/17 (!) 140/92   08/01/16 (!) 138/94     Weight management plan: Discussed healthy diet and exercise guidelines and patient will follow up in 12 months in clinic to re-evaluate.     reports that he has never smoked. He has quit using smokeless tobacco. His smokeless tobacco use included Chew.      Counseling Resources:  ATP IV Guidelines  Pooled Cohorts Equation Calculator  FRAX Risk Assessment  ICSI Preventive Guidelines  Dietary Guidelines for Americans, 2010  USDA's MyPlate  ASA Prophylaxis  Lung CA Screening    Henrry Hameed PA-C  [facility]  "

## 2018-11-01 NOTE — PATIENT INSTRUCTIONS
Preventive Health Recommendations  Male Ages 50 - 64    Yearly exam:             See your health care provider every year in order to  o   Review health changes.   o   Discuss preventive care.    o   Review your medicines if your doctor has prescribed any.     Have a cholesterol test every 5 years, or more frequently if you are at risk for high cholesterol/heart disease.     Have a diabetes test (fasting glucose) every three years. If you are at risk for diabetes, you should have this test more often.     Have a colonoscopy at age 50, or have a yearly FIT test (stool test). These exams will check for colon cancer.      Talk with your health care provider about whether or not a prostate cancer screening test (PSA) is right for you.    You should be tested each year for STDs (sexually transmitted diseases), if you re at risk.     Shots: Get a flu shot each year. Get a tetanus shot every 10 years.     Nutrition:    Eat at least 5 servings of fruits and vegetables daily.     Eat whole-grain bread, whole-wheat pasta and brown rice instead of white grains and rice.     Get adequate Calcium and Vitamin D.     Lifestyle    Exercise for at least 150 minutes a week (30 minutes a day, 5 days a week). This will help you control your weight and prevent disease.     Limit alcohol to one drink per day.     No smoking.     Wear sunscreen to prevent skin cancer.     See your dentist every six months for an exam and cleaning.     See your eye doctor every 1 to 2 years.    Will increase the dose of lisinopril to 20 mg to further decrease your blood pressure.  Check your home blood pressures and if consistently above 130/80, let me know.    I recommend you use the Efudex cream twice daily for 2 weeks to get rid of the spots on your legs as these are precancerous.    Work on a healthier diet and routine exercise.    Follow up annually.

## 2018-11-07 ENCOUNTER — OFFICE VISIT (OUTPATIENT)
Dept: FAMILY MEDICINE | Facility: OTHER | Age: 53
End: 2018-11-07
Payer: COMMERCIAL

## 2018-11-07 VITALS
BODY MASS INDEX: 38.08 KG/M2 | TEMPERATURE: 97 F | RESPIRATION RATE: 18 BRPM | WEIGHT: 266 LBS | HEART RATE: 84 BPM | OXYGEN SATURATION: 97 % | SYSTOLIC BLOOD PRESSURE: 136 MMHG | HEIGHT: 70 IN | DIASTOLIC BLOOD PRESSURE: 82 MMHG

## 2018-11-07 DIAGNOSIS — I10 HYPERTENSION GOAL BP (BLOOD PRESSURE) < 130/80: ICD-10-CM

## 2018-11-07 DIAGNOSIS — L57.0 ACTINIC KERATOSIS: ICD-10-CM

## 2018-11-07 DIAGNOSIS — E78.5 HYPERLIPIDEMIA LDL GOAL <130: ICD-10-CM

## 2018-11-07 DIAGNOSIS — Z00.00 ENCOUNTER FOR ROUTINE ADULT HEALTH EXAMINATION WITHOUT ABNORMAL FINDINGS: Primary | ICD-10-CM

## 2018-11-07 DIAGNOSIS — E66.09 CLASS 2 OBESITY DUE TO EXCESS CALORIES WITHOUT SERIOUS COMORBIDITY WITH BODY MASS INDEX (BMI) OF 37.0 TO 37.9 IN ADULT: ICD-10-CM

## 2018-11-07 DIAGNOSIS — E66.812 CLASS 2 OBESITY DUE TO EXCESS CALORIES WITHOUT SERIOUS COMORBIDITY WITH BODY MASS INDEX (BMI) OF 37.0 TO 37.9 IN ADULT: ICD-10-CM

## 2018-11-07 DIAGNOSIS — Z12.11 SCREEN FOR COLON CANCER: ICD-10-CM

## 2018-11-07 DIAGNOSIS — E78.1 HYPERTRIGLYCERIDEMIA: ICD-10-CM

## 2018-11-07 LAB
ANION GAP SERPL CALCULATED.3IONS-SCNC: 9 MMOL/L (ref 3–14)
BUN SERPL-MCNC: 13 MG/DL (ref 7–30)
CALCIUM SERPL-MCNC: 9.3 MG/DL (ref 8.5–10.1)
CHLORIDE SERPL-SCNC: 103 MMOL/L (ref 94–109)
CHOLEST SERPL-MCNC: 198 MG/DL
CO2 SERPL-SCNC: 27 MMOL/L (ref 20–32)
CREAT SERPL-MCNC: 0.83 MG/DL (ref 0.66–1.25)
GFR SERPL CREATININE-BSD FRML MDRD: >90 ML/MIN/1.7M2
GLUCOSE SERPL-MCNC: 123 MG/DL (ref 70–99)
HDLC SERPL-MCNC: 44 MG/DL
LDLC SERPL CALC-MCNC: 103 MG/DL
NONHDLC SERPL-MCNC: 154 MG/DL
POTASSIUM SERPL-SCNC: 4.2 MMOL/L (ref 3.4–5.3)
SODIUM SERPL-SCNC: 139 MMOL/L (ref 133–144)
TRIGL SERPL-MCNC: 256 MG/DL

## 2018-11-07 PROCEDURE — 80061 LIPID PANEL: CPT | Performed by: PHYSICIAN ASSISTANT

## 2018-11-07 PROCEDURE — 36415 COLL VENOUS BLD VENIPUNCTURE: CPT | Performed by: PHYSICIAN ASSISTANT

## 2018-11-07 PROCEDURE — 80048 BASIC METABOLIC PNL TOTAL CA: CPT | Performed by: PHYSICIAN ASSISTANT

## 2018-11-07 PROCEDURE — 99396 PREV VISIT EST AGE 40-64: CPT | Performed by: PHYSICIAN ASSISTANT

## 2018-11-07 RX ORDER — HYDROCHLOROTHIAZIDE 25 MG/1
TABLET ORAL
Qty: 90 TABLET | Refills: 3 | Status: SHIPPED | OUTPATIENT
Start: 2018-11-07 | End: 2019-12-06

## 2018-11-07 RX ORDER — SIMVASTATIN 20 MG
TABLET ORAL
Qty: 90 TABLET | Refills: 3 | Status: SHIPPED | OUTPATIENT
Start: 2018-11-07 | End: 2019-12-06

## 2018-11-07 RX ORDER — LISINOPRIL 10 MG/1
TABLET ORAL
Qty: 90 TABLET | Refills: 3 | Status: CANCELLED | OUTPATIENT
Start: 2018-11-07

## 2018-11-07 RX ORDER — LISINOPRIL 20 MG/1
20 TABLET ORAL DAILY
Qty: 90 TABLET | Refills: 3 | Status: SHIPPED | OUTPATIENT
Start: 2018-11-07 | End: 2019-12-06

## 2018-11-07 ASSESSMENT — ENCOUNTER SYMPTOMS
MUSCULOSKELETAL NEGATIVE: 1
HEADACHES: 0
CHILLS: 0
MYALGIAS: 0
NERVOUS/ANXIOUS: 0
PALPITATIONS: 0
NAUSEA: 0
CONSTITUTIONAL NEGATIVE: 1
COUGH: 0
WEAKNESS: 0
GASTROINTESTINAL NEGATIVE: 1
EYES NEGATIVE: 1
ENDOCRINE NEGATIVE: 1
PARESTHESIAS: 0
CARDIOVASCULAR NEGATIVE: 1
EYE PAIN: 0
DIARRHEA: 0
HEARTBURN: 0
RESPIRATORY NEGATIVE: 1
CONSTIPATION: 0
FEVER: 0
NEUROLOGICAL NEGATIVE: 1
DYSURIA: 0
HEMATURIA: 0
ALLERGIC/IMMUNOLOGIC NEGATIVE: 1
HEMATOCHEZIA: 0
ARTHRALGIAS: 0
SHORTNESS OF BREATH: 0
FREQUENCY: 0
PSYCHIATRIC NEGATIVE: 1
SORE THROAT: 0
DIZZINESS: 0
JOINT SWELLING: 0
HEMATOLOGIC/LYMPHATIC NEGATIVE: 1
ABDOMINAL PAIN: 0

## 2018-11-07 ASSESSMENT — PAIN SCALES - GENERAL: PAINLEVEL: NO PAIN (0)

## 2018-11-07 NOTE — MR AVS SNAPSHOT
After Visit Summary   11/7/2018    Jack Lenz    MRN: [id number]           Patient Information     Date Of Birth          1965        Visit Information        Provider Department      11/7/2018 8:00 AM Henrry Hameed PA-C [facility]        Today's Diagnoses     Encounter for routine adult health examination without abnormal findings    -  1    Hypertension goal BP (blood pressure) < 140/90        Actinic keratosis        Hypertriglyceridemia        Hyperlipidemia LDL goal <130        Screen for colon cancer          Care Instructions      Preventive Health Recommendations  Male Ages 50 - 64    Yearly exam:             See your health care provider every year in order to  o   Review health changes.   o   Discuss preventive care.    o   Review your medicines if your doctor has prescribed any.     Have a cholesterol test every 5 years, or more frequently if you are at risk for high cholesterol/heart disease.     Have a diabetes test (fasting glucose) every three years. If you are at risk for diabetes, you should have this test more often.     Have a colonoscopy at age 50, or have a yearly FIT test (stool test). These exams will check for colon cancer.      Talk with your health care provider about whether or not a prostate cancer screening test (PSA) is right for you.    You should be tested each year for STDs (sexually transmitted diseases), if you re at risk.     Shots: Get a flu shot each year. Get a tetanus shot every 10 years.     Nutrition:    Eat at least 5 servings of fruits and vegetables daily.     Eat whole-grain bread, whole-wheat pasta and brown rice instead of white grains and rice.     Get adequate Calcium and Vitamin D.     Lifestyle    Exercise for at least 150 minutes a week (30 minutes a day, 5 days a week). This will help you control your weight and prevent disease.     Limit alcohol to one drink per day.     No smoking.     Wear sunscreen to prevent  "skin cancer.     See your dentist every six months for an exam and cleaning.     See your eye doctor every 1 to 2 years.    Will increase the dose of lisinopril to 20 mg to further decrease your blood pressure.  Check your home blood pressures and if consistently above 130/80, let me know.    I recommend you use the Efudex cream twice daily for 2 weeks to get rid of the spots on your legs as these are precancerous.    Work on a healthier diet and routine exercise.    Follow up annually.             Follow-ups after your visit        Follow-up notes from your care team     Return in about 1 year (around 11/7/2019) for Routine Visit, Physical Exam.      Future tests that were ordered for you today     Open Future Orders        Priority Expected Expires Ordered    Fecal colorectal cancer screen (FIT) Routine 11/28/2018 1/30/2019 11/7/2018            Who to contact     If you have questions or need follow up information about today's clinic visit or your schedule please contact [facility] at [phone].  Normal or non-critical lab and imaging results will be communicated to you by MyChart, letter or phone within 4 business days after the clinic has received the results. If you do not hear from us within 7 days, please contact the clinic through MyChart or phone. If you have a critical or abnormal lab result, we will notify you by phone as soon as possible.  Submit refill requests through "[employer] or call your pharmacy and they will forward the refill request to us. Please allow 3 business days for your refill to be completed.          Additional Information About Your Visit        Care EveryWhere ID     This is your Care EveryWhere ID. This could be used by other organizations to access your [facility] medical records  [id number]        Your Vitals Were     Pulse Temperature Respirations Height Pulse Oximetry BMI (Body Mass Index)    84 97  F (36.1  C) (Temporal) 18 5' 10.47\" (1.79 m) 97% 37.66 " kg/m2       Blood Pressure from Last 3 Encounters:   11/07/18 136/82   11/06/17 (!) 140/92   08/01/16 (!) 138/94    Weight from Last 3 Encounters:   11/07/18 266 lb (120.7 kg)   11/06/17 264 lb (119.7 kg)   08/01/16 255 lb (115.7 kg)              We Performed the Following     BASIC METABOLIC PANEL     Lipid panel reflex to direct LDL Fasting          Today's Medication Changes          These changes are accurate as of 11/7/18  8:33 AM.  If you have any questions, ask your nurse or doctor.               These medicines have changed or have updated prescriptions.        Dose/Directions    * lisinopril 10 MG tablet   Commonly known as:  PRINIVIL/ZESTRIL   This may have changed:  Another medication with the same name was added. Make sure you understand how and when to take each.   Used for:  Hypertension goal BP (blood pressure) < 140/90   Changed by:  Henrry Hameed PA-C        TAKE 1 TAB BY MOUTH DAILY   Quantity:  90 tablet   Refills:  3       * lisinopril 20 MG tablet   Commonly known as:  PRINIVIL/ZESTRIL   This may have changed:  You were already taking a medication with the same name, and this prescription was added. Make sure you understand how and when to take each.   Used for:  Hypertension goal BP (blood pressure) < 140/90   Changed by:  Henrry Hameed PA-C        Dose:  20 mg   Take 1 tablet (20 mg) by mouth daily   Quantity:  90 tablet   Refills:  3       * Notice:  This list has 2 medication(s) that are the same as other medications prescribed for you. Read the directions carefully, and ask your doctor or other care provider to review them with you.      Stop taking these medicines if you haven't already. Please contact your care team if you have questions.     fluticasone 50 MCG/ACT spray   Commonly known as:  FLONASE   Stopped by:  Henrry Hameed PA-C                Where to get your medicines      These medications were sent to [facility] [id number] [place] [facility], [place] - [address]   [address] [address] [place] [address], [facility] [address]     Phone:  [phone]     hydrochlorothiazide 25 MG tablet    lisinopril 20 MG tablet    simvastatin 20 MG tablet                Primary Care Provider Office Phone # Fax #    Henrry Hameed PA-C [phone]       [address] [place] [address]   [facility] [address]        Equal Access to Services     [facility] : Hadii aad ku hadasho Soomaali, waaxda luqadaha, qaybta kaalmada adeegyada, waxay idiin hayaan adeeg khstarsh la'aan ah. So [facility] [phone].    ATENCIÓN: Si habla español, tiene a arias disposición servicios gratuitos de asistencia lingüística. Miguel Angel al [phone].    We comply with applicable federal civil rights laws and [place] laws. We do not discriminate on the basis of race, color, national origin, age, disability, sex, sexual orientation, or gender identity.            Thank you!     Thank you for choosing [facility]  for your care. Our goal is always to provide you with excellent care. Hearing back from our patients is one way we can continue to improve our services. Please take a few minutes to complete the written survey that you may receive in the mail after your visit with us. Thank you!             Your Updated Medication List - Protect others around you: Learn how to safely use, store and throw away your medicines at www.disposemymeds.org.          This list is accurate as of 11/7/18  8:33 AM.  Always use your most recent med list.                   Brand Name Dispense Instructions for use Diagnosis    ALLEGRA PO           fluorouracil 5 % cream    EFUDEX    40 g    Apply topically 2 times daily For 2 weeks    Actinic keratoses       hydrochlorothiazide 25 MG tablet    HYDRODIURIL    90 tablet    TAKE 1 TAB BY MOUTH DAILY    Hypertension goal BP (blood pressure) < 140/90       IBUPROFEN PO      Take 400 mg by mouth.        * lisinopril 10 MG tablet    PRINIVIL/ZESTRIL    90 tablet    TAKE 1 TAB BY MOUTH DAILY    Hypertension goal  BP (blood pressure) < 140/90       * lisinopril 20 MG tablet    PRINIVIL/ZESTRIL    90 tablet    Take 1 tablet (20 mg) by mouth daily    Hypertension goal BP (blood pressure) < 140/90       simvastatin 20 MG tablet    ZOCOR    90 tablet    TAKE 1 TAB BY MOUTH DAILY    Hyperlipidemia LDL goal <130       TYLENOL PO      Take 325 mg by mouth every 4 hours as needed.        * Notice:  This list has 2 medication(s) that are the same as other medications prescribed for you. Read the directions carefully, and ask your doctor or other care provider to review them with you.

## 2018-11-07 NOTE — NURSING NOTE
"Chief Complaint   Patient presents with     Physical     Panel Management     height, hiv, lipid, bmp, flu, colono, mychart       Initial /82  Pulse 84  Temp 97  F (36.1  C) (Temporal)  Resp 18  Ht 5' 10.47\" (1.79 m)  Wt 266 lb (120.7 kg)  SpO2 97%  BMI 37.66 kg/m2 Estimated body mass index is 37.66 kg/(m^2) as calculated from the following:    Height as of this encounter: 5' 10.47\" (1.79 m).    Weight as of this encounter: 266 lb (120.7 kg).  Medication Reconciliation: complete    Deepa Helm, MILENA      "

## 2018-11-07 NOTE — LETTER
November 7, 2018      Jack Lenz  [address]  [place] [address]        Dear Jack,     Your glucose and triglycerides were slightly elevated today but likely because your were not fasting. Your cholesterol, kidney function, and electrolytes are stable. Continue to work on a healthy diet with routine exercise. Thanks!     Results for orders placed or performed in visit on 11/07/18   BASIC METABOLIC PANEL   Result Value Ref Range    Sodium 139 133 - 144 mmol/L    Potassium 4.2 3.4 - 5.3 mmol/L    Chloride 103 94 - 109 mmol/L    Carbon Dioxide 27 20 - 32 mmol/L    Anion Gap 9 3 - 14 mmol/L    Glucose 123 (H) 70 - 99 mg/dL    Urea Nitrogen 13 7 - 30 mg/dL    Creatinine 0.83 0.66 - 1.25 mg/dL    GFR Estimate >90 >60 mL/min/1.7m2    GFR Estimate If Black >90 >60 mL/min/1.7m2    Calcium 9.3 8.5 - 10.1 mg/dL   Lipid panel reflex to direct LDL Fasting   Result Value Ref Range    Cholesterol 198 <200 mg/dL    Triglycerides 256 (H) <150 mg/dL    HDL Cholesterol 44 >39 mg/dL    LDL Cholesterol Calculated 103 (H) <100 mg/dL    Non HDL Cholesterol 154 (H) <130 mg/dL         Henrry Hameed PA-C

## 2018-12-21 ENCOUNTER — TELEPHONE (OUTPATIENT)
Dept: FAMILY MEDICINE | Facility: OTHER | Age: 53
End: 2018-12-21

## 2018-12-21 NOTE — LETTER
[facility]  [address]   [facility] [place] [address]  Phone: [phone]  January 2, 2019      Jack Lenz  [address]  [place] [address]      Dear Jack,    We care about your health and have reviewed your health plan including your medical conditions, medications, and lab results.  Based on this review, it is recommended that you follow up regarding the following health topic(s):  -Colon Cancer Screening    We recommend you take the following action(s):  -schedule a COLONOSCOPY to look for colon cancer (due every 10 years or 5 years in higher risk situations.)  Colonoscopies can prevent 90-95% of colon cancer deaths.  Problem lesions can be removed before they ever become cancer.  If you do not wish to do a colonoscopy or cannot afford to do one at this time, there is another option called a Fecal Immunochemical Occult Blood Test (FIT) a take home stool sample kit.  It does not replace the colonoscopy for colorectal cancer screening, but it can detect hidden bleeding in the lower colon.  It does need to be repeated every year and if a positive result is obtained, you would be referred for a colonoscopy.  If you have completed either one of these tests at another facility, please have the records sent to our clinic for our records.     Please call us at the [facility] - [phone] (or use [employer]) to address the above recommendations.     Thank you for trusting [facility] and we appreciate the opportunity to serve you.  We look forward to supporting your healthcare needs in the future.    Healthy Regards,    Your Health Care Team  [facility]

## 2018-12-21 NOTE — TELEPHONE ENCOUNTER
Summary:    Patient is due/failing the following:   FIT    Action needed:   Complete a FIT test     Type of outreach:    Phone, left message for patient to call back.     Questions for provider review:    None                                                                                                                                    Brianna Foster       Chart routed to Care Team .      Panel Management Review      Patient has the following on his problem list:     Hypertension   Last three blood pressure readings:  BP Readings from Last 3 Encounters:   11/07/18 136/82   11/06/17 (!) 140/92   08/01/16 (!) 138/94     Blood pressure: Passed    HTN Guidelines:  Age 18-59 BP range:  Less than 140/90  Age 60-85 with Diabetes:  Less than 140/90  Age 60-85 without Diabetes:  less than 150/90      Composite cancer screening  Chart review shows that this patient is due/due soon for the following Fecal Colorectal (FIT)

## 2019-04-08 ENCOUNTER — TELEPHONE (OUTPATIENT)
Dept: FAMILY MEDICINE | Facility: OTHER | Age: 54
End: 2019-04-08

## 2019-04-08 NOTE — LETTER
[facility]  [address]   [facility] [address]  Phone: [phone]  April 29, 2019      Jack Lenz  [address]  [place] [place] [address]      Dear Jack,    We care about your health and have reviewed your health plan including your medical conditions, medications, and lab results.  Based on this review, it is recommended that you follow up regarding the following health topic(s):  -Colon Cancer Screening    We recommend you take the following action(s):  -schedule a COLONOSCOPY to look for colon cancer (due every 10 years or 5 years in higher risk situations.)  Colonoscopies can prevent 90-95% of colon cancer deaths.  Problem lesions can be removed before they ever become cancer.  If you do not wish to do a colonoscopy or cannot afford to do one at this time, there is another option called a Fecal Immunochemical Occult Blood Test (FIT) a take home stool sample kit.  It does not replace the colonoscopy for colorectal cancer screening, but it can detect hidden bleeding in the lower colon.  It does need to be repeated every year and if a positive result is obtained, you would be referred for a colonoscopy.  If you have completed either one of these tests at another facility, please have the records sent to our clinic for our records.     Please call us at the [facility] - [phone] (or use [employer]) to address the above recommendations.     Thank you for trusting [facility] and we appreciate the opportunity to serve you.  We look forward to supporting your healthcare needs in the future.    Healthy Regards,    Your Health Care Team  [facility]

## 2019-12-06 ENCOUNTER — OFFICE VISIT (OUTPATIENT)
Dept: FAMILY MEDICINE | Facility: OTHER | Age: 54
End: 2019-12-06
Payer: COMMERCIAL

## 2019-12-06 VITALS
BODY MASS INDEX: 36.96 KG/M2 | DIASTOLIC BLOOD PRESSURE: 80 MMHG | HEIGHT: 71 IN | OXYGEN SATURATION: 100 % | RESPIRATION RATE: 16 BRPM | HEART RATE: 88 BPM | SYSTOLIC BLOOD PRESSURE: 134 MMHG | TEMPERATURE: 97.3 F | WEIGHT: 264 LBS

## 2019-12-06 DIAGNOSIS — Z00.00 ENCOUNTER FOR ROUTINE ADULT HEALTH EXAMINATION WITHOUT ABNORMAL FINDINGS: Primary | ICD-10-CM

## 2019-12-06 DIAGNOSIS — E66.812 CLASS 2 SEVERE OBESITY DUE TO EXCESS CALORIES WITH SERIOUS COMORBIDITY AND BODY MASS INDEX (BMI) OF 37.0 TO 37.9 IN ADULT (H): ICD-10-CM

## 2019-12-06 DIAGNOSIS — I10 HYPERTENSION GOAL BP (BLOOD PRESSURE) < 130/80: ICD-10-CM

## 2019-12-06 DIAGNOSIS — Z12.11 COLON CANCER SCREENING: ICD-10-CM

## 2019-12-06 DIAGNOSIS — E66.01 CLASS 2 SEVERE OBESITY DUE TO EXCESS CALORIES WITH SERIOUS COMORBIDITY AND BODY MASS INDEX (BMI) OF 37.0 TO 37.9 IN ADULT (H): ICD-10-CM

## 2019-12-06 DIAGNOSIS — R73.09 ELEVATED GLUCOSE: ICD-10-CM

## 2019-12-06 DIAGNOSIS — E78.5 HYPERLIPIDEMIA LDL GOAL <130: ICD-10-CM

## 2019-12-06 LAB
ANION GAP SERPL CALCULATED.3IONS-SCNC: 3 MMOL/L (ref 3–14)
BUN SERPL-MCNC: 14 MG/DL (ref 7–30)
CALCIUM SERPL-MCNC: 9.2 MG/DL (ref 8.5–10.1)
CHLORIDE SERPL-SCNC: 100 MMOL/L (ref 94–109)
CHOLEST SERPL-MCNC: 195 MG/DL
CO2 SERPL-SCNC: 34 MMOL/L (ref 20–32)
CREAT SERPL-MCNC: 0.88 MG/DL (ref 0.66–1.25)
GFR SERPL CREATININE-BSD FRML MDRD: >90 ML/MIN/{1.73_M2}
GLUCOSE SERPL-MCNC: 115 MG/DL (ref 70–99)
HBA1C MFR BLD: 5.8 % (ref 0–5.6)
HDLC SERPL-MCNC: 47 MG/DL
LDLC SERPL CALC-MCNC: 86 MG/DL
NONHDLC SERPL-MCNC: 148 MG/DL
POTASSIUM SERPL-SCNC: 4.2 MMOL/L (ref 3.4–5.3)
SODIUM SERPL-SCNC: 137 MMOL/L (ref 133–144)
TRIGL SERPL-MCNC: 309 MG/DL

## 2019-12-06 PROCEDURE — 99396 PREV VISIT EST AGE 40-64: CPT | Performed by: PHYSICIAN ASSISTANT

## 2019-12-06 PROCEDURE — 80048 BASIC METABOLIC PNL TOTAL CA: CPT | Performed by: PHYSICIAN ASSISTANT

## 2019-12-06 PROCEDURE — 83036 HEMOGLOBIN GLYCOSYLATED A1C: CPT | Performed by: PHYSICIAN ASSISTANT

## 2019-12-06 PROCEDURE — 80061 LIPID PANEL: CPT | Performed by: PHYSICIAN ASSISTANT

## 2019-12-06 PROCEDURE — 36415 COLL VENOUS BLD VENIPUNCTURE: CPT | Performed by: PHYSICIAN ASSISTANT

## 2019-12-06 RX ORDER — HYDROCHLOROTHIAZIDE 25 MG/1
TABLET ORAL
Qty: 90 TABLET | Refills: 3 | Status: SHIPPED | OUTPATIENT
Start: 2019-12-06 | End: 2020-11-21

## 2019-12-06 RX ORDER — MULTIVIT-MIN/IRON/FOLIC ACID/K 18-600-40
CAPSULE ORAL
COMMUNITY

## 2019-12-06 RX ORDER — SIMVASTATIN 20 MG
TABLET ORAL
Qty: 90 TABLET | Refills: 3 | Status: SHIPPED | OUTPATIENT
Start: 2019-12-06 | End: 2020-11-21

## 2019-12-06 RX ORDER — LISINOPRIL 20 MG/1
20 TABLET ORAL DAILY
Qty: 90 TABLET | Refills: 3 | Status: SHIPPED | OUTPATIENT
Start: 2019-12-06 | End: 2020-11-21

## 2019-12-06 ASSESSMENT — MIFFLIN-ST. JEOR: SCORE: 2056.69

## 2019-12-06 NOTE — PATIENT INSTRUCTIONS
Preventive Health Recommendations  Male Ages 50 - 64    Yearly exam:             See your health care provider every year in order to  o   Review health changes.   o   Discuss preventive care.    o   Review your medicines if your doctor has prescribed any.     Have a cholesterol test every 5 years, or more frequently if you are at risk for high cholesterol/heart disease.     Have a diabetes test (fasting glucose) every three years. If you are at risk for diabetes, you should have this test more often.     Have a colonoscopy at age 50, or have a yearly FIT test (stool test). These exams will check for colon cancer.      Talk with your health care provider about whether or not a prostate cancer screening test (PSA) is right for you.    You should be tested each year for STDs (sexually transmitted diseases), if you re at risk.     Shots: Get a flu shot each year. Get a tetanus shot every 10 years.     Nutrition:    Eat at least 5 servings of fruits and vegetables daily.     Eat whole-grain bread, whole-wheat pasta and brown rice instead of white grains and rice.     Get adequate Calcium and Vitamin D.     Lifestyle    Exercise for at least 150 minutes a week (30 minutes a day, 5 days a week). This will help you control your weight and prevent disease.     Limit alcohol to one drink per day.     No smoking.     Wear sunscreen to prevent skin cancer.     See your dentist every six months for an exam and cleaning.     See your eye doctor every 1 to 2 years.    Continue with a healthier, low carb diet with small frequent meals along with routine exercise 4-5 days per week for at least 30 minutes.    Follow up annually.

## 2019-12-06 NOTE — PROGRESS NOTES
"SUBJECTIVE:   CC: Jack Lenz is an 54 year old male who presents for preventative health visit.     Healthy Habits:    Getting at least 3 servings of Calcium per day:  Yes    Bi-annual eye exam:  NO    Dental care twice a year:  Yes    Sleep apnea or symptoms of sleep apnea:  None    Diet:  Regular (no restrictions)    Frequency of exercise:  None (\"not enough\")    Taking medications regularly:  Yes    Barriers to taking medications:  None    Medication side effects:  Other (\"I was getting groggy after one of the medications was upped to 20mg\")    PHQ-2 Total Score:    Additional concerns today:  No    He has no new concerns. He has right knee was causing him a lot of pain but ever since starting turmeric, pain has significantly improved     Today's PHQ-2 Score:   PHQ-2 ( 1999 [employer]) 12/6/2019   Q1: Little interest or pleasure in doing things 0   Q2: Feeling down, depressed or hopeless 0   PHQ-2 Score 0   Q1: Little interest or pleasure in doing things -   Q2: Feeling down, depressed or hopeless -   PHQ-2 Score -     Abuse: Current or Past(Physical, Sexual or Emotional)- No  Do you feel safe in your environment? Yes    Social History     Tobacco Use     Smoking status: Never Smoker     Smokeless tobacco: Former User     Types: Chew   Substance Use Topics     Alcohol use: Yes     Comment: 3-5/week     If you drink alcohol do you typically have >3 drinks per day or >7 drinks per week? No    Alcohol Use 12/6/2019   Prescreen: >3 drinks/day or >7 drinks/week? -   Prescreen: >3 drinks/day or >7 drinks/week? No       Last PSA:   PSA   Date Value Ref Range Status   11/06/2017 2.53 0 - 4 ug/L Final     Comment:     Assay Method:  Chemiluminescence using Siemens Vista analyzer       Reviewed orders with patient. Reviewed health maintenance and updated orders accordingly - Yes    Reviewed and updated as needed this visit by clinical staff  Tobacco  Allergies  Meds  Med Hx  Surg Hx  Fam Hx  Soc Hx        Reviewed " "and updated as needed this visit by Provider  Tobacco  Allergies  Meds  Problems  Med Hx  Surg Hx  Fam Hx      Review of Systems  CONSTITUTIONAL: NEGATIVE for fever, chills, change in weight  INTEGUMENTARY/SKIN: NEGATIVE for worrisome rashes, moles or lesions  EYES: NEGATIVE for vision changes or irritation  ENT: NEGATIVE for ear, mouth and throat problems  RESP: NEGATIVE for significant cough or SOB  CV: NEGATIVE for chest pain, palpitations or peripheral edema  GI: NEGATIVE for nausea, abdominal pain, heartburn, or change in bowel habits   male: negative for dysuria, hematuria, decreased urinary stream, erectile dysfunction, urethral discharge  MUSCULOSKELETAL: NEGATIVE for significant arthralgias or myalgia  NEURO: NEGATIVE for weakness, dizziness or paresthesias  ENDOCRINE: NEGATIVE for temperature intolerance, skin/hair changes  HEME/ALLERGY/IMMUNE: NEGATIVE for bleeding problems  PSYCHIATRIC: NEGATIVE for changes in mood or affect    OBJECTIVE:   /80   Pulse 88   Temp 97.3  F (36.3  C) (Temporal)   Resp 16   Ht 1.791 m (5' 10.5\")   Wt 119.7 kg (264 lb)   SpO2 100%   BMI 37.34 kg/m      Physical Exam  GENERAL: healthy, alert and no distress  EYES: Eyes grossly normal to inspection, PERRL and conjunctivae and sclerae normal  HENT: ear canals and TM's normal, nose and mouth without ulcers or lesions  NECK: no adenopathy, no asymmetry, masses, or scars and thyroid normal to palpation  RESP: lungs clear to auscultation - no rales, rhonchi or wheezes  CV: regular rate and rhythm, normal S1 S2, no S3 or S4, no murmur, click or rub, no peripheral edema and peripheral pulses strong  ABDOMEN: soft, nontender, no hepatosplenomegaly, no masses and bowel sounds normal   (male): normal male circumcised genitalia without lesions or urethral discharge, no hernia  MS: no gross musculoskeletal defects noted, no edema. FROM to all extremities. No spinal tenderness.   SKIN: no suspicious lesions or rashes. " "Multiple whitish, stuck on, rough papules covering legs consistent with benign stucco keratoses.  NEURO: Normal strength and tone, mentation intact and speech normal. Cranial nerves II-XII are grossly intact. DTRs are 2+/4 throughout and symmetric. Gait is stable.  PSYCH: mentation appears normal, affect normal/bright    ASSESSMENT/PLAN:       ICD-10-CM    1. Encounter for routine adult health examination without abnormal findings Z00.00    2. Hypertension goal BP (blood pressure) < 130/80 I10 hydrochlorothiazide (HYDRODIURIL) 25 MG tablet     lisinopril (PRINIVIL/ZESTRIL) 20 MG tablet     Basic metabolic panel  (Ca, Cl, CO2, Creat, Gluc, K, Na, BUN)   3. Hyperlipidemia LDL goal <130 E78.5 simvastatin (ZOCOR) 20 MG tablet     Lipid panel reflex to direct LDL Fasting   4. Elevated glucose R73.09 Hemoglobin A1c   5. Class 2 severe obesity due to excess calories with serious comorbidity and body mass index (BMI) of 37.0 to 37.9 in adult (H) E66.01     Z68.37    6. Colon cancer screening Z12.11 Fecal colorectal cancer screen (FIT)       1. BP is well controlled on current medications. Updated fasting BMP ordered.    3. Continue simvastatin. Updated fasting lipid panel ordered.    4-5. Elevated fasting glucose over the past few years so will check an updated A1c today. I discussed the importance of routine exercise outside of work for at least 30-60 minutes 4-5 days per week. I also recommend small, frequent low carb meals.    6. He will complete FIT testing.    Follow up annually.    COUNSELING:   Reviewed preventive health counseling, as reflected in patient instructions       Regular exercise       Healthy diet/nutrition    Estimated body mass index is 37.34 kg/m  as calculated from the following:    Height as of this encounter: 1.791 m (5' 10.5\").    Weight as of this encounter: 119.7 kg (264 lb).     Weight management plan: Discussed healthy diet and exercise guidelines     reports that he has never smoked. He has " quit using smokeless tobacco.  His smokeless tobacco use included chew.      Counseling Resources:  ATP IV Guidelines  Pooled Cohorts Equation Calculator  FRAX Risk Assessment  ICSI Preventive Guidelines  Dietary Guidelines for Americans, 2010  USDA's MyPlate  ASA Prophylaxis  Lung CA Screening    Henrry Hameed PA-C  [facility]

## 2019-12-09 ENCOUNTER — TELEPHONE (OUTPATIENT)
Dept: FAMILY MEDICINE | Facility: OTHER | Age: 54
End: 2019-12-09

## 2019-12-09 NOTE — TELEPHONE ENCOUNTER
Called patient left message to call clinic back. When patient calls back please relay message below.    Nick Oconnell MA on 12/9/2019 at 10:49 AM

## 2019-12-09 NOTE — TELEPHONE ENCOUNTER
----- Message from Henrry Hameed PA-C sent at 12/6/2019  4:38 PM CST -----  Jack,    Your glucose remains elevated and hemoglobin A1c is slightly elevated indicating prediabetes. Working on a low carb, low sugar diet with routine exercise 4-5 days per week for at least 30 minutes will help prevent you from progressing to diabetes. Your cholesterol, kidney function, and electrolytes are normal. Thanks!    Henrry Hameed PA-C

## 2020-11-19 DIAGNOSIS — E78.5 HYPERLIPIDEMIA LDL GOAL <130: ICD-10-CM

## 2020-11-19 DIAGNOSIS — I10 HYPERTENSION GOAL BP (BLOOD PRESSURE) < 130/80: ICD-10-CM

## 2020-11-19 NOTE — LETTER
[facility]  [address]  [place] [address]  Phone: [phone]  November 25, 2020      Jackyonis Lenz  [address]  [place] [address]      Dear Jack,    We care about your health and have reviewed your health plan including your medical conditions, medications, and lab results.  Based on this review, it is recommended that you follow up regarding the following health topic(s):  -Cholesterol  -High Blood Pressure  -Colon Cancer Screening  -Wellness (Physical) Visit   -Shingles and Influenza Vaccinations    We recommend you take the following action(s):  -schedule a WELLNESS (Physical) APPOINTMENT.  We will perform the following labs: Lipids (fasting cholesterol - nothing to eat except water and/or meds for 8-10 hours), BMP (basic metabolic panel) and HIV, HepC Screening.  -schedule a COLONOSCOPY to look for colon cancer (due every 10 years or 5 years in higher risk situations.)  Colonoscopies can prevent 90-95% of colon cancer deaths.  Problem lesions can be removed before they ever become cancer.  If you do not wish to do a colonoscopy or cannot afford to do one at this time, there is another option called a Fecal Immunochemical Occult Blood Test (FIT) a take home stool sample kit.  It does not replace the colonoscopy for colorectal cancer screening, but it can detect hidden bleeding in the lower colon.  It does need to be repeated every year and if a positive result is obtained, you would be referred for a colonoscopy.  If you have completed either one of these tests at another facility, please have the records sent to our clinic for our records.     Please call us at the [facility] - [phone] (or use Remark) to address the above recommendations.     Thank you for trusting [facility] and we appreciate the opportunity to serve you.  We look forward to supporting your healthcare needs in the future.    Healthy Regards,    Your  Health Care Team  [facility]

## 2020-11-21 RX ORDER — LISINOPRIL 20 MG/1
20 TABLET ORAL DAILY
Qty: 90 TABLET | Refills: 0 | Status: SHIPPED | OUTPATIENT
Start: 2020-11-21 | End: 2020-12-18

## 2020-11-21 RX ORDER — HYDROCHLOROTHIAZIDE 25 MG/1
TABLET ORAL
Qty: 90 TABLET | Refills: 0 | Status: SHIPPED | OUTPATIENT
Start: 2020-11-21 | End: 2020-12-18

## 2020-11-21 RX ORDER — SIMVASTATIN 20 MG
TABLET ORAL
Qty: 90 TABLET | Refills: 0 | Status: SHIPPED | OUTPATIENT
Start: 2020-11-21 | End: 2020-12-18

## 2020-11-21 NOTE — TELEPHONE ENCOUNTER
Pending Prescriptions:                       Disp   Refills    hydrochlorothiazide (HYDRODIURIL) 25 MG t*90 tab*0            Sig: TAKE 1 TAB BY MOUTH DAILY    lisinopril (ZESTRIL) 20 MG tablet         90 tab*0            Sig: Take 1 tablet (20 mg) by mouth daily    simvastatin (ZOCOR) 20 MG tablet          90 tab*0            Sig: TAKE 1 TAB BY MOUTH DAILY    Medication is being filled for 1 time spencer refill only due to:  Patient is due for due for annual 12/2020    Please call and help schedule.  Thank you!  Edelmira Jackson RN

## 2020-11-24 NOTE — TELEPHONE ENCOUNTER
Called patient and left a message. Need to schedule a physical for 12/2020 per note below. Thank you

## 2020-12-16 NOTE — PROGRESS NOTES
"Jack Lenz is a 55 year old male who is be3ing evaluated via a billable telephone visit.      The patient has been notified of following:     \"This telephone visit will be conducted via a call between you and your physician/provider. We have found that certain health care needs can be provided without the need for a physical exam.  This service lets us provide the care you need with a short phone conversation.  If a prescription is necessary we can send it directly to your pharmacy.  If lab work is needed we can place an order for that and you can then stop by our lab to have the test done at a later time.    Telephone visits are billed at different rates depending on your insurance coverage. During this emergency period, for some insurers they may be billed the same as an in-person visit.  Please reach out to your insurance provider with any questions.    If during the course of the call the physician/provider feels a telephone visit is not appropriate, you will not be charged for this service.\"    Patient has given verbal consent for Telephone visit?  Yes    What phone number would you like to be contacted at? [phone]    How would you like to obtain your AVS? Mail a copy    Subjective     Jack Lenz is a 55 year old male who presents via phone visit today for the following health issues:    HPI    Hyperlipidemia Follow-Up    Are you regularly taking any medication or supplement to lower your cholesterol?   Yes- zocor    Are you having muscle aches or other side effects that you think could be caused by your cholesterol lowering medication?  No    Hypertension Follow-up    Do you check your blood pressure regularly outside of the clinic? Yes     Are you following a low salt diet? Yes    Are your blood pressures ever more than 140 on the top number (systolic) OR more   than 90 on the bottom number (diastolic), for example 140/90? No    He has overall been doing well. Home blood pressures have been " running in the 120s and 130s systolic. He has not been as active as he should and has not lost any weight since his last visit. He has been taking a daily antihistamine which helps with pressure/ringing in his ears.     Review of Systems   Constitutional, HEENT, cardiovascular, pulmonary, gi and gu systems are negative, except as otherwise noted.       Objective      Vitals:  No vitals were obtained today due to virtual visit.    General: alert and no distress over the phone  PSYCH: Alert and oriented times 3; coherent speech, normal   rate and volume, able to articulate logical thoughts, able   to abstract reason, no tangential thoughts, no hallucinations   or delusions. His affect is normal  RESP: No cough, no audible wheezing, able to talk in full sentences  Remainder of exam unable to be completed due to telephone visits        Assessment/Plan:      ICD-10-CM    1. Hypertension goal BP (blood pressure) < 130/80  I10 lisinopril (ZESTRIL) 20 MG tablet     hydrochlorothiazide (HYDRODIURIL) 25 MG tablet     Basic metabolic panel  (Ca, Cl, CO2, Creat, Gluc, K, Na, BUN)   2. Hyperlipidemia LDL goal <130  E78.5 simvastatin (ZOCOR) 20 MG tablet     Lipid panel reflex to direct LDL Fasting   3. Prediabetes  R73.03 Hemoglobin A1c   4. Colon cancer screening  Z12.11 Fecal colorectal cancer screen (FIT)       1. Continue current medications along with home blood pressure monitoring. Will order updated fasting labs to be completed within the next few weeks. I recommend more routine exercise and a healthy, low carb diet.    2. Continue simvastatin. Updated fasting lipid panel to be completed in the near future.    3. Will order an updated A1c. He will work on the lifestyle changes as noted above.    4. Updated FIT testing ordered.    Follow up in 6 months in clinic for an annual physical.       Henrry Hameed PA-C  [facility]    Phone call duration:  12 minutes

## 2020-12-18 ENCOUNTER — VIRTUAL VISIT (OUTPATIENT)
Dept: FAMILY MEDICINE | Facility: OTHER | Age: 55
End: 2020-12-18
Payer: COMMERCIAL

## 2020-12-18 DIAGNOSIS — Z12.11 COLON CANCER SCREENING: ICD-10-CM

## 2020-12-18 DIAGNOSIS — E78.5 HYPERLIPIDEMIA LDL GOAL <130: ICD-10-CM

## 2020-12-18 DIAGNOSIS — I10 HYPERTENSION GOAL BP (BLOOD PRESSURE) < 130/80: Primary | ICD-10-CM

## 2020-12-18 DIAGNOSIS — R73.03 PREDIABETES: ICD-10-CM

## 2020-12-18 PROCEDURE — 99214 OFFICE O/P EST MOD 30 MIN: CPT | Mod: 95 | Performed by: PHYSICIAN ASSISTANT

## 2020-12-18 RX ORDER — SIMVASTATIN 20 MG
TABLET ORAL
Qty: 90 TABLET | Refills: 1 | Status: SHIPPED | OUTPATIENT
Start: 2020-12-18 | End: 2021-10-06

## 2020-12-18 RX ORDER — LISINOPRIL 20 MG/1
20 TABLET ORAL DAILY
Qty: 90 TABLET | Refills: 1 | Status: SHIPPED | OUTPATIENT
Start: 2020-12-18 | End: 2021-10-06

## 2020-12-18 RX ORDER — HYDROCHLOROTHIAZIDE 25 MG/1
TABLET ORAL
Qty: 90 TABLET | Refills: 1 | Status: SHIPPED | OUTPATIENT
Start: 2020-12-18 | End: 2021-10-06

## 2021-02-23 DIAGNOSIS — R73.03 PREDIABETES: ICD-10-CM

## 2021-02-23 DIAGNOSIS — I10 HYPERTENSION GOAL BP (BLOOD PRESSURE) < 130/80: ICD-10-CM

## 2021-02-23 DIAGNOSIS — E78.5 HYPERLIPIDEMIA LDL GOAL <130: ICD-10-CM

## 2021-02-23 LAB
ANION GAP SERPL CALCULATED.3IONS-SCNC: 6 MMOL/L (ref 3–14)
BUN SERPL-MCNC: 13 MG/DL (ref 7–30)
CALCIUM SERPL-MCNC: 9.3 MG/DL (ref 8.5–10.1)
CHLORIDE SERPL-SCNC: 102 MMOL/L (ref 94–109)
CHOLEST SERPL-MCNC: 202 MG/DL
CO2 SERPL-SCNC: 29 MMOL/L (ref 20–32)
CREAT SERPL-MCNC: 0.81 MG/DL (ref 0.66–1.25)
GFR SERPL CREATININE-BSD FRML MDRD: >90 ML/MIN/{1.73_M2}
GLUCOSE SERPL-MCNC: 122 MG/DL (ref 70–99)
HBA1C MFR BLD: 5.7 % (ref 0–5.6)
HDLC SERPL-MCNC: 43 MG/DL
LDLC SERPL CALC-MCNC: 108 MG/DL
NONHDLC SERPL-MCNC: 159 MG/DL
POTASSIUM SERPL-SCNC: 4.1 MMOL/L (ref 3.4–5.3)
SODIUM SERPL-SCNC: 137 MMOL/L (ref 133–144)
TRIGL SERPL-MCNC: 264 MG/DL

## 2021-02-23 PROCEDURE — 80061 LIPID PANEL: CPT | Performed by: PHYSICIAN ASSISTANT

## 2021-02-23 PROCEDURE — 83036 HEMOGLOBIN GLYCOSYLATED A1C: CPT | Performed by: PHYSICIAN ASSISTANT

## 2021-02-23 PROCEDURE — 36415 COLL VENOUS BLD VENIPUNCTURE: CPT | Performed by: PHYSICIAN ASSISTANT

## 2021-02-23 PROCEDURE — 80048 BASIC METABOLIC PNL TOTAL CA: CPT | Performed by: PHYSICIAN ASSISTANT

## 2021-09-30 ENCOUNTER — LAB (OUTPATIENT)
Dept: LAB | Facility: OTHER | Age: 56
End: 2021-09-30
Payer: COMMERCIAL

## 2021-09-30 ENCOUNTER — DOCUMENTATION ONLY (OUTPATIENT)
Dept: LAB | Facility: OTHER | Age: 56
End: 2021-09-30

## 2021-09-30 DIAGNOSIS — I10 HYPERTENSION GOAL BP (BLOOD PRESSURE) < 130/80: Primary | ICD-10-CM

## 2021-09-30 DIAGNOSIS — Z12.5 SCREENING FOR PROSTATE CANCER: ICD-10-CM

## 2021-09-30 DIAGNOSIS — R73.03 PREDIABETES: Primary | ICD-10-CM

## 2021-09-30 DIAGNOSIS — I10 HYPERTENSION GOAL BP (BLOOD PRESSURE) < 130/80: ICD-10-CM

## 2021-09-30 DIAGNOSIS — E78.5 HYPERLIPIDEMIA LDL GOAL <130: ICD-10-CM

## 2021-09-30 LAB
ANION GAP SERPL CALCULATED.3IONS-SCNC: 5 MMOL/L (ref 3–14)
BUN SERPL-MCNC: 16 MG/DL (ref 7–30)
CALCIUM SERPL-MCNC: 8.9 MG/DL (ref 8.5–10.1)
CHLORIDE BLD-SCNC: 105 MMOL/L (ref 94–109)
CHOLEST SERPL-MCNC: 172 MG/DL
CO2 SERPL-SCNC: 27 MMOL/L (ref 20–32)
CREAT SERPL-MCNC: 0.84 MG/DL (ref 0.66–1.25)
GFR SERPL CREATININE-BSD FRML MDRD: >90 ML/MIN/1.73M2
GLUCOSE BLD-MCNC: 125 MG/DL (ref 70–99)
HBA1C MFR BLD: 5.5 % (ref 0–5.6)
HDLC SERPL-MCNC: 44 MG/DL
HOLD SPECIMEN: NORMAL
LDLC SERPL CALC-MCNC: 89 MG/DL
NONHDLC SERPL-MCNC: 128 MG/DL
POTASSIUM BLD-SCNC: 4.3 MMOL/L (ref 3.4–5.3)
PSA SERPL-MCNC: 2.42 UG/L (ref 0–4)
SODIUM SERPL-SCNC: 137 MMOL/L (ref 133–144)
TRIGL SERPL-MCNC: 195 MG/DL

## 2021-09-30 PROCEDURE — 80061 LIPID PANEL: CPT

## 2021-09-30 PROCEDURE — G0103 PSA SCREENING: HCPCS

## 2021-09-30 PROCEDURE — 83036 HEMOGLOBIN GLYCOSYLATED A1C: CPT

## 2021-09-30 PROCEDURE — 80048 BASIC METABOLIC PNL TOTAL CA: CPT

## 2021-09-30 PROCEDURE — 36415 COLL VENOUS BLD VENIPUNCTURE: CPT

## 2021-09-30 NOTE — PROGRESS NOTES
Patient was here today and stated that he has an appointment with you next week. He needed his blood done for his blood pressure med refill.   I did draw extra if you need to add test.   Thank you  Kyara BENDER) [facility]

## 2021-10-06 ENCOUNTER — VIRTUAL VISIT (OUTPATIENT)
Dept: FAMILY MEDICINE | Facility: OTHER | Age: 56
End: 2021-10-06
Payer: COMMERCIAL

## 2021-10-06 DIAGNOSIS — Z12.11 COLON CANCER SCREENING: ICD-10-CM

## 2021-10-06 DIAGNOSIS — I10 HYPERTENSION GOAL BP (BLOOD PRESSURE) < 130/80: Primary | ICD-10-CM

## 2021-10-06 DIAGNOSIS — M15.0 PRIMARY OSTEOARTHRITIS INVOLVING MULTIPLE JOINTS: ICD-10-CM

## 2021-10-06 DIAGNOSIS — R73.09 ELEVATED GLUCOSE: ICD-10-CM

## 2021-10-06 DIAGNOSIS — E78.5 HYPERLIPIDEMIA LDL GOAL <130: ICD-10-CM

## 2021-10-06 PROCEDURE — 99214 OFFICE O/P EST MOD 30 MIN: CPT | Mod: 95 | Performed by: PHYSICIAN ASSISTANT

## 2021-10-06 RX ORDER — SIMVASTATIN 20 MG
TABLET ORAL
Qty: 90 TABLET | Refills: 3 | Status: SHIPPED | OUTPATIENT
Start: 2021-10-06 | End: 2022-08-04

## 2021-10-06 RX ORDER — HYDROCHLOROTHIAZIDE 25 MG/1
TABLET ORAL
Qty: 90 TABLET | Refills: 3 | Status: SHIPPED | OUTPATIENT
Start: 2021-10-06 | End: 2022-08-04

## 2021-10-06 RX ORDER — LISINOPRIL 20 MG/1
20 TABLET ORAL DAILY
Qty: 90 TABLET | Refills: 3 | Status: SHIPPED | OUTPATIENT
Start: 2021-10-06 | End: 2022-08-04

## 2021-10-06 NOTE — PROGRESS NOTES
Jack is a 56 year old who is being evaluated via a billable telephone visit.      What phone number would you like to be contacted at? [phone]  How would you like to obtain your AVS? Mail a copy    Assessment & Plan       ICD-10-CM    1. Hypertension goal BP (blood pressure) < 130/80  I10 hydrochlorothiazide (HYDRODIURIL) 25 MG tablet     lisinopril (ZESTRIL) 20 MG tablet   2. Hyperlipidemia LDL goal <130  E78.5 simvastatin (ZOCOR) 20 MG tablet   3. Elevated glucose  R73.09    4. Colon cancer screening  Z12.11    5. Primary osteoarthritis involving multiple joints  M89.49        1. Blood pressure has been borderline high at home. I recommend he come in for a pharmacy BP check within the next week and if elevated, will increase lisinopril to 30 mg but will continue current doses of lisinopril and hydrochlorithiazide for now. Recommend a low salt diet and routine exercise. BMP is stable.    2. Continue simvastatin. Cholesterol is stable.    3. Continue working on a low carb, low sugar diet with routine exercise. A1c normal but glucose slightly elevated.    4. He will complete his FIT testing.    5. Continue Aleve/ibuprofen as needed and add glucosamine.    Follow up annually.    Henrry Hameed PA-C  [facility] Santiago is a 56 year old who presents for the following health issues:    HPI     Hyperlipidemia Follow-Up      Are you regularly taking any medication or supplement to lower your cholesterol?   Yes- Simvastatin    Are you having muscle aches or other side effects that you think could be caused by your cholesterol lowering medication?  No    Hypertension Follow-up      Do you check your blood pressure regularly outside of the clinic? Yes     Are you following a low salt diet? Yes    Are your blood pressures ever more than 140 on the top number (systolic) OR more   than 90 on the bottom number (diastolic), for example 140/90? Yes    Blood pressure has been around  "135/90 consistently, sometimes higher, sometimes lower.    He has chronic \"arthritis\" with generalized body and joint aching. He takes Aleve and ibuprofen as needed which works much better than Tylenol.       Review of Systems   Constitutional, cardiovascular, pulmonary, musculoskeletal, neuro, gi and gu systems are negative, except as otherwise noted.      Objective       Vitals:  No vitals were obtained today due to virtual visit.    Physical Exam   healthy, alert and no distress  PSYCH: Alert and oriented times 3; coherent speech, normal   rate and volume, able to articulate logical thoughts, able   to abstract reason, no tangential thoughts, no hallucinations   or delusions. His affect is normal  RESP: No cough, no audible wheezing, able to talk in full sentences  Remainder of exam unable to be completed due to telephone visits          Phone call duration: 9 minutes  "

## 2021-10-06 NOTE — PATIENT INSTRUCTIONS
Continue current medications.    Come in for a blood pressure pharmacy check.    Work on a healthier, low carb diet with more routine exercise.    Follow up annually.    Try glucosamine.

## 2022-08-04 ENCOUNTER — OFFICE VISIT (OUTPATIENT)
Dept: FAMILY MEDICINE | Facility: OTHER | Age: 57
End: 2022-08-04
Payer: COMMERCIAL

## 2022-08-04 VITALS
TEMPERATURE: 96.5 F | SYSTOLIC BLOOD PRESSURE: 120 MMHG | HEART RATE: 97 BPM | BODY MASS INDEX: 35.42 KG/M2 | OXYGEN SATURATION: 97 % | HEIGHT: 71 IN | DIASTOLIC BLOOD PRESSURE: 80 MMHG | RESPIRATION RATE: 20 BRPM | WEIGHT: 253 LBS

## 2022-08-04 DIAGNOSIS — E66.01 MORBID OBESITY (H): ICD-10-CM

## 2022-08-04 DIAGNOSIS — Z12.11 SCREEN FOR COLON CANCER: ICD-10-CM

## 2022-08-04 DIAGNOSIS — R73.03 PREDIABETES: ICD-10-CM

## 2022-08-04 DIAGNOSIS — Z00.00 ENCOUNTER FOR ROUTINE ADULT HEALTH EXAMINATION WITHOUT ABNORMAL FINDINGS: Primary | ICD-10-CM

## 2022-08-04 DIAGNOSIS — I10 HYPERTENSION GOAL BP (BLOOD PRESSURE) < 130/80: ICD-10-CM

## 2022-08-04 DIAGNOSIS — E78.5 HYPERLIPIDEMIA LDL GOAL <130: ICD-10-CM

## 2022-08-04 LAB
ANION GAP SERPL CALCULATED.3IONS-SCNC: 5 MMOL/L (ref 3–14)
BUN SERPL-MCNC: 20 MG/DL (ref 7–30)
CALCIUM SERPL-MCNC: 8.9 MG/DL (ref 8.5–10.1)
CHLORIDE BLD-SCNC: 104 MMOL/L (ref 94–109)
CHOLEST SERPL-MCNC: 184 MG/DL
CO2 SERPL-SCNC: 28 MMOL/L (ref 20–32)
CREAT SERPL-MCNC: 0.88 MG/DL (ref 0.66–1.25)
FASTING STATUS PATIENT QL REPORTED: YES
GFR SERPL CREATININE-BSD FRML MDRD: >90 ML/MIN/1.73M2
GLUCOSE BLD-MCNC: 143 MG/DL (ref 70–99)
HBA1C MFR BLD: 6.2 % (ref 0–5.6)
HDLC SERPL-MCNC: 42 MG/DL
LDLC SERPL CALC-MCNC: 83 MG/DL
NONHDLC SERPL-MCNC: 142 MG/DL
POTASSIUM BLD-SCNC: 4 MMOL/L (ref 3.4–5.3)
SODIUM SERPL-SCNC: 137 MMOL/L (ref 133–144)
TRIGL SERPL-MCNC: 297 MG/DL

## 2022-08-04 PROCEDURE — 80061 LIPID PANEL: CPT | Performed by: PHYSICIAN ASSISTANT

## 2022-08-04 PROCEDURE — 80048 BASIC METABOLIC PNL TOTAL CA: CPT | Performed by: PHYSICIAN ASSISTANT

## 2022-08-04 PROCEDURE — 99213 OFFICE O/P EST LOW 20 MIN: CPT | Mod: 25 | Performed by: PHYSICIAN ASSISTANT

## 2022-08-04 PROCEDURE — 99396 PREV VISIT EST AGE 40-64: CPT | Performed by: PHYSICIAN ASSISTANT

## 2022-08-04 PROCEDURE — 36415 COLL VENOUS BLD VENIPUNCTURE: CPT | Performed by: PHYSICIAN ASSISTANT

## 2022-08-04 PROCEDURE — 83036 HEMOGLOBIN GLYCOSYLATED A1C: CPT | Performed by: PHYSICIAN ASSISTANT

## 2022-08-04 RX ORDER — LISINOPRIL 20 MG/1
20 TABLET ORAL DAILY
Qty: 90 TABLET | Refills: 3 | Status: SHIPPED | OUTPATIENT
Start: 2022-08-04 | End: 2023-08-17

## 2022-08-04 RX ORDER — HYDROCHLOROTHIAZIDE 25 MG/1
TABLET ORAL
Qty: 90 TABLET | Refills: 3 | Status: SHIPPED | OUTPATIENT
Start: 2022-08-04 | End: 2023-08-17

## 2022-08-04 RX ORDER — LORATADINE 10 MG/1
10 TABLET ORAL DAILY
COMMUNITY

## 2022-08-04 RX ORDER — SIMVASTATIN 20 MG
TABLET ORAL
Qty: 90 TABLET | Refills: 3 | Status: SHIPPED | OUTPATIENT
Start: 2022-08-04 | End: 2023-08-17

## 2022-08-04 ASSESSMENT — ENCOUNTER SYMPTOMS
COUGH: 0
HEARTBURN: 0
PALPITATIONS: 0
PARESTHESIAS: 0
HEMATURIA: 0
WEAKNESS: 0
ARTHRALGIAS: 0
FREQUENCY: 0
HEMATOCHEZIA: 0
NERVOUS/ANXIOUS: 0
JOINT SWELLING: 0
HEADACHES: 0
SORE THROAT: 0
ABDOMINAL PAIN: 0
SHORTNESS OF BREATH: 0
NAUSEA: 0
DIZZINESS: 0
DYSURIA: 0
CONSTIPATION: 0
EYE PAIN: 0
CHILLS: 0
MYALGIAS: 0
DIARRHEA: 0
FEVER: 0

## 2022-08-04 NOTE — PROGRESS NOTES
SUBJECTIVE:   CC: Jack Lenz is an 57 year old male who presents for preventative health visit.       Patient has been advised of split billing requirements and indicates understanding: Yes  Healthy Habits:     Getting at least 3 servings of Calcium per day:  Yes    Bi-annual eye exam:  NO    Dental care twice a year:  Yes    Sleep apnea or symptoms of sleep apnea:  None    Diet:  Regular (no restrictions)    Frequency of exercise:  1 day/week    Duration of exercise:  Less than 15 minutes    Taking medications regularly:  Yes    Medication side effects:  None    PHQ-2 Total Score: 0    Additional concerns today:  No    He feels everything has overall been stable. His knee pain is better during the summer months.    Today's PHQ-2 Score:   PHQ-2 ( 1999 Pf[employer]) 8/4/2022   Q1: Little interest or pleasure in doing things 0   Q2: Feeling down, depressed or hopeless 0   PHQ-2 Score 0   PHQ-2 Total Score (12-17 Years)- Positive if 3 or more points; Administer PHQ-A if positive -   Q1: Little interest or pleasure in doing things Not at all   Q2: Feeling down, depressed or hopeless Not at all   PHQ-2 Score 0       Abuse: Current or Past(Physical, Sexual or Emotional)- No  Do you feel safe in your environment? Yes    Have you ever done Advance Care Planning? (For example, a Health Directive, POLST, or a discussion with a medical provider or your loved ones about your wishes): No, advance care planning information given to patient to review.  Patient declined advance care planning discussion at this time.    Social History     Tobacco Use     Smoking status: Never Smoker     Smokeless tobacco: Former User     Types: Chew   Substance Use Topics     Alcohol use: Yes     Comment: 3-5/week     If you drink alcohol do you typically have >3 drinks per day or >7 drinks per week? No    Alcohol Use 8/4/2022   Prescreen: >3 drinks/day or >7 drinks/week? No   Prescreen: >3 drinks/day or >7 drinks/week? -       Last PSA:   PSA   Date  "Value Ref Range Status   11/06/2017 2.53 0 - 4 ug/L Final     Comment:     Assay Method:  Chemiluminescence using Siemens Vista analyzer     Prostate Specific Antigen Screen   Date Value Ref Range Status   09/30/2021 2.42 0.00 - 4.00 ug/L Final       Reviewed orders with patient. Reviewed health maintenance and updated orders accordingly - Yes    Reviewed and updated as needed this visit by clinical staff   Tobacco  Allergies  Meds  Problems  Med Hx  Surg Hx  Fam Hx  Soc   Hx          Reviewed and updated as needed this visit by Provider   Tobacco  Allergies  Meds  Problems  Med Hx  Surg Hx  Fam Hx             Review of Systems   Constitutional: Negative for chills and fever.   HENT: Negative for congestion, ear pain, hearing loss and sore throat.    Eyes: Negative for pain and visual disturbance.   Respiratory: Negative for cough and shortness of breath.    Cardiovascular: Negative for chest pain, palpitations and peripheral edema.   Gastrointestinal: Negative for abdominal pain, constipation, diarrhea, heartburn, hematochezia and nausea.   Genitourinary: Negative for dysuria, frequency, genital sores, hematuria, impotence, penile discharge and urgency.   Musculoskeletal: Negative for arthralgias, joint swelling and myalgias.   Skin: Negative for rash.   Neurological: Negative for dizziness, weakness, headaches and paresthesias.   Psychiatric/Behavioral: Negative for mood changes. The patient is not nervous/anxious.        OBJECTIVE:   /80   Pulse 97   Temp (!) 96.5  F (35.8  C) (Temporal)   Resp 20   Ht 1.791 m (5' 10.5\")   Wt 114.8 kg (253 lb)   SpO2 97%   BMI 35.79 kg/m      Physical Exam  GENERAL: healthy, alert and no distress  EYES: Eyes grossly normal to inspection, PERRL and conjunctivae and sclerae normal  HENT: ear canals and TM's normal, nose and mouth without ulcers or lesions  NECK: no adenopathy, no asymmetry, masses, or scars and thyroid normal to palpation  RESP: lungs " clear to auscultation - no rales, rhonchi or wheezes  CV: regular rate and rhythm, normal S1 S2, no S3 or S4, no murmur, click or rub, no peripheral edema and peripheral pulses strong  ABDOMEN: soft, nontender, no hepatosplenomegaly, no masses and bowel sounds normal   (male): normal male circumcised genitalia without lesions or urethral discharge, no hernia  MS: no gross musculoskeletal defects noted, no edema. FROM to all extremities. No spinal tenderness.   SKIN: no suspicious lesions or rashes  NEURO: Normal strength and tone, mentation intact and speech normal. Cranial nerves II-XII are grossly intact. DTRs are 2+/4 throughout and symmetric. Gait is stable. .  PSYCH: mentation appears normal, affect normal/bright    Lab Results   Component Value Date    A1C 6.2 08/04/2022    A1C 5.5 09/30/2021    A1C 5.7 02/23/2021    A1C 5.8 12/06/2019    A1C 5.3 06/02/2015       ASSESSMENT/PLAN:       ICD-10-CM    1. Encounter for routine adult health examination without abnormal findings  Z00.00    2. Hypertension goal BP (blood pressure) < 130/80  I10 hydrochlorothiazide (HYDRODIURIL) 25 MG tablet     lisinopril (ZESTRIL) 20 MG tablet     Basic metabolic panel  (Ca, Cl, CO2, Creat, Gluc, K, Na, BUN)     Basic metabolic panel  (Ca, Cl, CO2, Creat, Gluc, K, Na, BUN)   3. Morbid obesity (H)  E66.01    4. Hyperlipidemia LDL goal <130  E78.5 simvastatin (ZOCOR) 20 MG tablet     Lipid panel reflex to direct LDL Fasting     Lipid panel reflex to direct LDL Fasting   5. Prediabetes  R73.03 Hemoglobin A1c     Hemoglobin A1c   6. Screen for colon cancer  Z12.11 COLOGUARD([employer])         2. BP is stable on lisinopril and hydrochlorithiazide. Will check updated BMP today.     3, 5. He has lost some weight just being more active in the summer but hemoglobin A1c has slightly increased to 6.2. I recommend he work on monitoring his portion sizes and carb loaded foods along with more routine exercise to help with weight loss and  "decreased risk of progressing to diabetes.    4. Continue simvastatin. Updated fasting lipid panel ordered.    6. Cologuard testing ordered.    Follow-up annually.       Patient has been advised of split billing requirements and indicates understanding: Yes    COUNSELING:   Reviewed preventive health counseling, as reflected in patient instructions       Regular exercise       Healthy diet/nutrition    Estimated body mass index is 35.79 kg/m  as calculated from the following:    Height as of this encounter: 1.791 m (5' 10.5\").    Weight as of this encounter: 114.8 kg (253 lb).     Weight management plan: Discussed healthy diet and exercise guidelines    He reports that he has never smoked. He has quit using smokeless tobacco.  His smokeless tobacco use included chew.      Counseling Resources:  ATP IV Guidelines  Pooled Cohorts Equation Calculator  FRAX Risk Assessment  ICSI Preventive Guidelines  Dietary Guidelines for Americans, 2010  USDA's MyPlate  ASA Prophylaxis  Lung CA Screening    Henrry Hameed PA-C  [facility]  "

## 2022-08-04 NOTE — PATIENT INSTRUCTIONS
Preventive Health Recommendations  Male Ages 50 - 64    Yearly exam:             See your health care provider every year in order to  o   Review health changes.   o   Discuss preventive care.    o   Review your medicines if your doctor has prescribed any.   Have a cholesterol test every 5 years, or more frequently if you are at risk for high cholesterol/heart disease.   Have a diabetes test (fasting glucose) every three years. If you are at risk for diabetes, you should have this test more often.   Have a colonoscopy at age 50, or have a yearly FIT test (stool test). These exams will check for colon cancer.    Talk with your health care provider about whether or not a prostate cancer screening test (PSA) is right for you.  You should be tested each year for STDs (sexually transmitted diseases), if you re at risk.     Shots: Get a flu shot each year. Get a tetanus shot every 10 years.     Nutrition:  Eat at least 5 servings of fruits and vegetables daily.   Eat whole-grain bread, whole-wheat pasta and brown rice instead of white grains and rice.   Get adequate Calcium and Vitamin D.     Lifestyle  Exercise for at least 150 minutes a week (30 minutes a day, 5 days a week). This will help you control your weight and prevent disease.   Limit alcohol to one drink per day.   No smoking.   Wear sunscreen to prevent skin cancer.   See your dentist every six months for an exam and cleaning.   See your eye doctor every 1 to 2 years.    Continue current medications.    Stay active with a low carb diet.    Will order Cologuard testing for colon cancer screening. They will mail this to your house.

## 2022-08-08 ENCOUNTER — TELEPHONE (OUTPATIENT)
Dept: FAMILY MEDICINE | Facility: OTHER | Age: 57
End: 2022-08-08

## 2022-08-08 NOTE — TELEPHONE ENCOUNTER
Left message for pt to return our call for info:      Your triglycerides remains elevated and your hemoglobin A1c has increased to 6.2 indicating prediabetes, getting closer to diabetes. Please work on a healthier, low carb, low sugar diet with more routine exercise. Thanks!     Henrry Hameed PA-C

## 2022-09-02 LAB — NONINV COLON CA DNA+OCC BLD SCRN STL QL: NEGATIVE

## 2022-10-05 DIAGNOSIS — E78.5 HYPERLIPIDEMIA LDL GOAL <130: ICD-10-CM

## 2022-10-05 DIAGNOSIS — I10 HYPERTENSION GOAL BP (BLOOD PRESSURE) < 130/80: ICD-10-CM

## 2022-10-06 RX ORDER — SIMVASTATIN 20 MG
TABLET ORAL
Qty: 90 TABLET | Refills: 3 | OUTPATIENT
Start: 2022-10-06

## 2022-10-06 RX ORDER — LISINOPRIL 20 MG/1
TABLET ORAL
Qty: 90 TABLET | Refills: 3 | OUTPATIENT
Start: 2022-10-06

## 2022-10-06 RX ORDER — HYDROCHLOROTHIAZIDE 25 MG/1
TABLET ORAL
Qty: 90 TABLET | Refills: 3 | OUTPATIENT
Start: 2022-10-06

## 2023-07-05 ENCOUNTER — PATIENT OUTREACH (OUTPATIENT)
Dept: CARE COORDINATION | Facility: CLINIC | Age: 58
End: 2023-07-05

## 2023-07-19 ENCOUNTER — PATIENT OUTREACH (OUTPATIENT)
Dept: CARE COORDINATION | Facility: CLINIC | Age: 58
End: 2023-07-19

## 2023-08-17 DIAGNOSIS — E78.5 HYPERLIPIDEMIA LDL GOAL <130: ICD-10-CM

## 2023-08-17 DIAGNOSIS — I10 HYPERTENSION GOAL BP (BLOOD PRESSURE) < 130/80: ICD-10-CM

## 2023-08-17 RX ORDER — HYDROCHLOROTHIAZIDE 25 MG/1
TABLET ORAL
Qty: 90 TABLET | Refills: 0 | Status: SHIPPED | OUTPATIENT
Start: 2023-08-17 | End: 2023-09-21

## 2023-08-17 RX ORDER — LISINOPRIL 20 MG/1
20 TABLET ORAL DAILY
Qty: 90 TABLET | Refills: 0 | Status: SHIPPED | OUTPATIENT
Start: 2023-08-17 | End: 2023-09-21

## 2023-08-17 RX ORDER — SIMVASTATIN 20 MG
TABLET ORAL
Qty: 90 TABLET | Refills: 0 | Status: SHIPPED | OUTPATIENT
Start: 2023-08-17 | End: 2023-09-21

## 2023-08-17 NOTE — TELEPHONE ENCOUNTER
Prescription approved per [facility] Refill Protocol.  Brittany Guevara, RN  [facility] Triage Nurse

## 2023-09-21 ENCOUNTER — OFFICE VISIT (OUTPATIENT)
Dept: FAMILY MEDICINE | Facility: OTHER | Age: 58
End: 2023-09-21
Payer: COMMERCIAL

## 2023-09-21 VITALS
RESPIRATION RATE: 15 BRPM | OXYGEN SATURATION: 97 % | DIASTOLIC BLOOD PRESSURE: 88 MMHG | HEART RATE: 94 BPM | SYSTOLIC BLOOD PRESSURE: 148 MMHG | HEIGHT: 70 IN | BODY MASS INDEX: 35.22 KG/M2 | TEMPERATURE: 97.3 F | WEIGHT: 246 LBS

## 2023-09-21 DIAGNOSIS — E78.1 HYPERTRIGLYCERIDEMIA: ICD-10-CM

## 2023-09-21 DIAGNOSIS — I10 HYPERTENSION GOAL BP (BLOOD PRESSURE) < 130/80: ICD-10-CM

## 2023-09-21 DIAGNOSIS — M25.811 IMPINGEMENT OF BOTH SHOULDERS: ICD-10-CM

## 2023-09-21 DIAGNOSIS — E66.01 MORBID OBESITY (H): ICD-10-CM

## 2023-09-21 DIAGNOSIS — E78.5 HYPERLIPIDEMIA LDL GOAL <130: ICD-10-CM

## 2023-09-21 DIAGNOSIS — R73.03 PREDIABETES: ICD-10-CM

## 2023-09-21 DIAGNOSIS — M25.812 IMPINGEMENT OF BOTH SHOULDERS: ICD-10-CM

## 2023-09-21 DIAGNOSIS — Z00.00 ENCOUNTER FOR ROUTINE ADULT HEALTH EXAMINATION WITHOUT ABNORMAL FINDINGS: Primary | ICD-10-CM

## 2023-09-21 LAB
ANION GAP SERPL CALCULATED.3IONS-SCNC: 11 MMOL/L (ref 7–15)
BUN SERPL-MCNC: 12.9 MG/DL (ref 6–20)
CALCIUM SERPL-MCNC: 9.9 MG/DL (ref 8.6–10)
CHLORIDE SERPL-SCNC: 97 MMOL/L (ref 98–107)
CHOLEST SERPL-MCNC: 199 MG/DL
CREAT SERPL-MCNC: 0.81 MG/DL (ref 0.67–1.17)
DEPRECATED HCO3 PLAS-SCNC: 30 MMOL/L (ref 22–29)
EGFRCR SERPLBLD CKD-EPI 2021: >90 ML/MIN/1.73M2
GLUCOSE SERPL-MCNC: 181 MG/DL (ref 70–99)
HBA1C MFR BLD: 8.6 % (ref 0–5.6)
HDLC SERPL-MCNC: 43 MG/DL
LDLC SERPL CALC-MCNC: ABNORMAL MG/DL
NONHDLC SERPL-MCNC: 156 MG/DL
POTASSIUM SERPL-SCNC: 4.3 MMOL/L (ref 3.4–5.3)
SODIUM SERPL-SCNC: 138 MMOL/L (ref 136–145)
TRIGL SERPL-MCNC: 407 MG/DL

## 2023-09-21 PROCEDURE — 80061 LIPID PANEL: CPT | Performed by: PHYSICIAN ASSISTANT

## 2023-09-21 PROCEDURE — 99214 OFFICE O/P EST MOD 30 MIN: CPT | Mod: 25 | Performed by: PHYSICIAN ASSISTANT

## 2023-09-21 PROCEDURE — 36415 COLL VENOUS BLD VENIPUNCTURE: CPT | Performed by: PHYSICIAN ASSISTANT

## 2023-09-21 PROCEDURE — 83721 ASSAY OF BLOOD LIPOPROTEIN: CPT | Performed by: PHYSICIAN ASSISTANT

## 2023-09-21 PROCEDURE — 83036 HEMOGLOBIN GLYCOSYLATED A1C: CPT | Performed by: PHYSICIAN ASSISTANT

## 2023-09-21 PROCEDURE — 99396 PREV VISIT EST AGE 40-64: CPT | Mod: 25 | Performed by: PHYSICIAN ASSISTANT

## 2023-09-21 PROCEDURE — 80048 BASIC METABOLIC PNL TOTAL CA: CPT | Performed by: PHYSICIAN ASSISTANT

## 2023-09-21 RX ORDER — LISINOPRIL 30 MG/1
30 TABLET ORAL DAILY
Qty: 90 TABLET | Refills: 3 | Status: SHIPPED | OUTPATIENT
Start: 2023-09-21 | End: 2024-06-03

## 2023-09-21 RX ORDER — SIMVASTATIN 20 MG
TABLET ORAL
Qty: 90 TABLET | Refills: 3 | Status: SHIPPED | OUTPATIENT
Start: 2023-09-21 | End: 2024-09-24

## 2023-09-21 RX ORDER — LISINOPRIL 20 MG/1
20 TABLET ORAL DAILY
Qty: 90 TABLET | Refills: 0 | Status: CANCELLED | OUTPATIENT
Start: 2023-09-21

## 2023-09-21 RX ORDER — HYDROCHLOROTHIAZIDE 25 MG/1
TABLET ORAL
Qty: 90 TABLET | Refills: 3 | Status: SHIPPED | OUTPATIENT
Start: 2023-09-21 | End: 2024-09-24

## 2023-09-21 ASSESSMENT — ENCOUNTER SYMPTOMS
DYSURIA: 0
DIZZINESS: 0
FREQUENCY: 0
EYE PAIN: 0
FEVER: 0
PALPITATIONS: 0
DIARRHEA: 0
HEADACHES: 0
HEMATOCHEZIA: 0
CHILLS: 0
HEARTBURN: 0
CONSTIPATION: 0
MYALGIAS: 0
NERVOUS/ANXIOUS: 0
WEAKNESS: 0
NAUSEA: 0
PARESTHESIAS: 0
ARTHRALGIAS: 0
COUGH: 0
HEMATURIA: 0
JOINT SWELLING: 0
ABDOMINAL PAIN: 0
SHORTNESS OF BREATH: 0
SORE THROAT: 0

## 2023-09-21 ASSESSMENT — PAIN SCALES - GENERAL: PAINLEVEL: NO PAIN (0)

## 2023-09-21 NOTE — PATIENT INSTRUCTIONS
Preventive Health Recommendations  Male Ages 50 - 64    Yearly exam:             See your health care provider every year in order to  o   Review health changes.   o   Discuss preventive care.    o   Review your medicines if your doctor has prescribed any.   Have a cholesterol test every 5 years, or more frequently if you are at risk for high cholesterol/heart disease.   Have a diabetes test (fasting glucose) every three years. If you are at risk for diabetes, you should have this test more often.   Have a colonoscopy at age 50, or have a yearly FIT test (stool test). These exams will check for colon cancer.    Talk with your health care provider about whether or not a prostate cancer screening test (PSA) is right for you.  You should be tested each year for STDs (sexually transmitted diseases), if you re at risk.     Shots: Get a flu shot each year. Get a tetanus shot every 10 years.     Nutrition:  Eat at least 5 servings of fruits and vegetables daily.   Eat whole-grain bread, whole-wheat pasta and brown rice instead of white grains and rice.   Get adequate Calcium and Vitamin D.     Lifestyle  Exercise for at least 150 minutes a week (30 minutes a day, 5 days a week). This will help you control your weight and prevent disease.   Limit alcohol to one drink per day.   No smoking.   Wear sunscreen to prevent skin cancer.   See your dentist every six months for an exam and cleaning.   See your eye doctor every 1 to 2 years.    Will increase lisinopril to 30 mg for better blood pressure control.  If consistently above 140/90, let me know.    Follow-up annually.

## 2023-09-21 NOTE — PROGRESS NOTES
SUBJECTIVE:   CC: Jack is an 58 year old who presents for preventative health visit.       9/21/2023     1:35 PM   Additional Questions   Roomed by Tamika   Accompanied by Self         9/21/2023     1:35 PM   Patient Reported Additional Medications   Patient reports taking the following new medications none       Healthy Habits:     Getting at least 3 servings of Calcium per day:  Yes    Bi-annual eye exam:  Yes    Dental care twice a year:  Yes    Sleep apnea or symptoms of sleep apnea:  None    Diet:  Regular (no restrictions)    Frequency of exercise:  1 day/week    Duration of exercise:  15-30 minutes    Taking medications regularly:  Yes    Medication side effects:  None    Additional concerns today:  No    He has been out of his blood pressure medications for a few days. Even when he was on the medications, he had bought a new cuff and it seemed to squeeze his left arm harder than usual but showed his blood pressure was 150/90, even on the medications. He has been trying to pursue a healthy lifestyle but admits to probably consuming too much caffeine.      Increased bilateral shoulder pain over the past few years, worse when reaching behind him, but does not inhibit too much during his day.    Today's PHQ-2 Score:       9/21/2023     1:28 PM   PHQ-2 ( 1999 Pfizer)   Q1: Little interest or pleasure in doing things 0   Q2: Feeling down, depressed or hopeless 0   PHQ-2 Score 0   Q1: Little interest or pleasure in doing things Not at all   Q2: Feeling down, depressed or hopeless Not at all   PHQ-2 Score 0       Hyperlipidemia Follow-Up    Are you regularly taking any medication or supplement to lower your cholesterol?   Yes- Simvistatin 20 mg  Are you having muscle aches or other side effects that you think could be caused by your cholesterol lowering medication?  No    Hypertension Follow-up    Do you check your blood pressure regularly outside of the clinic? Yes   Are you following a low salt diet? No  Are  your blood pressures ever more than 140 on the top number (systolic) OR more   than 90 on the bottom number (diastolic), for example 140/90? No  Usually 130's/80's      Social History     Tobacco Use    Smoking status: Never    Smokeless tobacco: Former     Types: Chew   Substance Use Topics    Alcohol use: Yes     Comment: 3-5/week         9/21/2023     1:28 PM   Alcohol Use   Prescreen: >3 drinks/day or >7 drinks/week? No       Last PSA:   PSA   Date Value Ref Range Status   11/06/2017 2.53 0 - 4 ug/L Final     Comment:     Assay Method:  Chemiluminescence using Siemens Vista analyzer     Prostate Specific Antigen Screen   Date Value Ref Range Status   09/30/2021 2.42 0.00 - 4.00 ug/L Final       Reviewed orders with patient. Reviewed health maintenance and updated orders accordingly - Yes    Reviewed and updated as needed this visit by clinical staff   Tobacco  Allergies  Meds              Reviewed and updated as needed this visit by Provider   Tobacco  Allergies  Meds  Problems  Med Hx  Surg Hx  Fam Hx           Review of Systems   Constitutional:  Negative for chills and fever.   HENT:  Negative for congestion, ear pain, hearing loss and sore throat.    Eyes:  Negative for pain and visual disturbance.   Respiratory:  Negative for cough and shortness of breath.    Cardiovascular:  Negative for chest pain, palpitations and peripheral edema.   Gastrointestinal:  Negative for abdominal pain, constipation, diarrhea, heartburn, hematochezia and nausea.   Genitourinary:  Negative for dysuria, frequency, genital sores, hematuria, impotence, penile discharge and urgency.   Musculoskeletal:  Negative for arthralgias, joint swelling and myalgias.   Skin:  Negative for rash.   Neurological:  Negative for dizziness, weakness, headaches and paresthesias.   Psychiatric/Behavioral:  Negative for mood changes. The patient is not nervous/anxious.        OBJECTIVE:   BP (!) 148/88   Pulse 94   Temp 97.3  F (36.3  C)  "(Temporal)   Resp 15   Ht 1.781 m (5' 10.12\")   Wt 111.6 kg (246 lb)   SpO2 97%   BMI 35.18 kg/m      Physical Exam  GENERAL: healthy, alert and no distress  EYES: Eyes grossly normal to inspection, PERRL and conjunctivae and sclerae normal  HENT: ear canals and TM's normal, nose and mouth without ulcers or lesions  NECK: no adenopathy, no asymmetry, masses, or scars and thyroid normal to palpation  RESP: lungs clear to auscultation - no rales, rhonchi or wheezes  CV: regular rate and rhythm, normal S1 S2, no S3 or S4, no murmur, click or rub, no peripheral edema and peripheral pulses strong  ABDOMEN: soft, nontender, no hepatosplenomegaly, no masses and bowel sounds normal  MS: no gross musculoskeletal defects noted, no edema. FROM to all extremities. No spinal tenderness. Slow raising of both arms above head due to pain. Limited internal shoulder rotation.  SKIN: no suspicious lesions or rashes  NEURO: Normal strength and tone, mentation intact and speech normal. Cranial nerves II-XII are grossly intact. DTRs are 2+/4 throughout and symmetric. Gait is stable.  PSYCH: mentation appears normal, affect normal/bright      ASSESSMENT/PLAN:       ICD-10-CM    1. Encounter for routine adult health examination without abnormal findings  Z00.00       2. Hypertension goal BP (blood pressure) < 130/80  I10 BASIC METABOLIC PANEL     hydrochlorothiazide (HYDRODIURIL) 25 MG tablet     lisinopril (ZESTRIL) 30 MG tablet     BASIC METABOLIC PANEL      3. Hyperlipidemia LDL goal <130  E78.5 simvastatin (ZOCOR) 20 MG tablet      4. Hypertriglyceridemia  E78.1 Lipid panel reflex to direct LDL Non-fasting     Lipid panel reflex to direct LDL Non-fasting      5. Morbid obesity (H)  E66.01       6. Prediabetes  R73.03       7. Impingement of both shoulders  M25.811     M25.812           2, 5. Blood pressure elevated today but he has been out of his medications for 2 days. However, blood pressure has been elevated at home even on " "the medication recently so will increase lisinopril to 30 mg daily and keep hydrochlorithiazide at 25 mg. Continue to monitor home pressures and if consistently above 140/90, he will let me know. Will also have him follow-up in 1 month at the pharmacy for a blood pressure recheck. I recommend he work on a lower salt, lower caffeine diet with decreased portion sizes and more routine exercise as well which will also further assist in weight loss.     3-4. Continue simvastatin. Updated fasting lipid panel ordered.    6. Updated hemoglobin A1c ordered. I recommend a healthier diet and more routine exercise as noted above.    7. Bilateral shoulder pain, most consistent with impingement. He does not want any further work up at this time but I recommend OTC analgesics and home stretching, ice, and heat as needed.     Follow-up annually.    Patient has been advised of split billing requirements and indicates understanding: Yes      COUNSELING:   Reviewed preventive health counseling, as reflected in patient instructions       Regular exercise       Healthy diet/nutrition      BMI:   Estimated body mass index is 35.18 kg/m  as calculated from the following:    Height as of this encounter: 1.781 m (5' 10.12\").    Weight as of this encounter: 111.6 kg (246 lb).   Weight management plan: Discussed healthy diet and exercise guidelines      He reports that he has never smoked. He has quit using smokeless tobacco.  His smokeless tobacco use included chew.            Henrry Hameed PA-C  [facility]  "

## 2023-09-22 LAB — LDLC SERPL DIRECT ASSAY-MCNC: 109 MG/DL

## 2023-09-27 ENCOUNTER — TELEPHONE (OUTPATIENT)
Dept: FAMILY MEDICINE | Facility: OTHER | Age: 58
End: 2023-09-27
Payer: COMMERCIAL

## 2023-09-27 NOTE — TELEPHONE ENCOUNTER
RN Triage    Patient Contact    Attempt # 1    Was call answered?  No.  Left message on voicemail with information to call me back.    Stu Vazquez, MSN, RN, PHN  [place]/[facility]  September 27, 2023

## 2023-09-27 NOTE — LETTER
October 3, 2023      Jack Lenz  [address]  [place] [address]        Dear ,    We are writing to inform you of your test results. We have been trying to reach you via phone.     An A1c of 8.6 which unfortunately places him with a new diagnosis of type 2 diabetes. Please schedule an in person visit within the next month to discuss this further with Adam Hameed. You may call us to schedule at [phone].       Resulted Orders   Hemoglobin A1c   Result Value Ref Range    Hemoglobin A1C 8.6 (H) 0.0 - 5.6 %       If you have any questions or concerns, please call the clinic at the number listed above.       Sincerely,        -[facility] Care Team

## 2023-09-27 NOTE — TELEPHONE ENCOUNTER
----- Message from Shala Short sent at 9/27/2023  8:49 AM CDT -----    ----- Message -----  From: Henrry Hameed PA-C  Sent: 9/21/2023   8:49 PM CDT  To: Erc Registration    Please call and notify patient that he has a hemoglobin A1c of 8.6 which unfortunately places him with a new diagnosis of type 2 diabetes. Please schedule an in person visit within the next month to discuss this further. Same day spot is fine.    Henrry Hameed PA-C      New diagnosis of type 2 diabetes with elevated triglycerides and cholesterol. Please notify patient of this and schedule visit within the next 6 weeks to discuss this further. Same day appt is fine.     Henrry Hameed PA-C

## 2023-09-29 NOTE — TELEPHONE ENCOUNTER
Patient Contact    Attempt # 2    Was call answered?  No.  Unable to leave message.    Kendra DON, RN  [facility]

## 2023-10-02 NOTE — TELEPHONE ENCOUNTER
RN called the patient and LM for the patient to return call to the clinic.     Please call and notify patient that he has a hemoglobin A1c of 8.6 which unfortunately places him with a new diagnosis of type 2 diabetes. Please schedule an in person visit within the next month to discuss this further. Same day spot is fine.    Henrry Vazquez, KIM, RN, PHN  [place]/[facility]  October 2, 2023

## 2023-10-03 NOTE — TELEPHONE ENCOUNTER
RN called the patient and LM for the patient to return call to the clinic.   RN sent a letter.     Please call and notify patient that he has a hemoglobin A1c of 8.6 which unfortunately places him with a new diagnosis of type 2 diabetes. Please schedule an in person visit within the next month to discuss this further. Same day spot is fine.    Henrry Vazquez, KIM, RN, PHN  [place]/[facility]  October 3, 2023

## 2024-03-04 ENCOUNTER — OFFICE VISIT (OUTPATIENT)
Dept: FAMILY MEDICINE | Facility: OTHER | Age: 59
End: 2024-03-04
Payer: COMMERCIAL

## 2024-03-04 ENCOUNTER — TELEPHONE (OUTPATIENT)
Dept: FAMILY MEDICINE | Facility: OTHER | Age: 59
End: 2024-03-04

## 2024-03-04 VITALS
BODY MASS INDEX: 32.89 KG/M2 | SYSTOLIC BLOOD PRESSURE: 140 MMHG | TEMPERATURE: 97.6 F | HEART RATE: 87 BPM | DIASTOLIC BLOOD PRESSURE: 80 MMHG | RESPIRATION RATE: 16 BRPM | WEIGHT: 230 LBS | OXYGEN SATURATION: 98 %

## 2024-03-04 DIAGNOSIS — E11.9 TYPE 2 DIABETES MELLITUS WITHOUT COMPLICATION, WITH LONG-TERM CURRENT USE OF INSULIN (H): ICD-10-CM

## 2024-03-04 DIAGNOSIS — E78.5 HYPERLIPIDEMIA LDL GOAL <100: ICD-10-CM

## 2024-03-04 DIAGNOSIS — Z79.4 TYPE 2 DIABETES MELLITUS WITHOUT COMPLICATION, WITH LONG-TERM CURRENT USE OF INSULIN (H): Primary | ICD-10-CM

## 2024-03-04 DIAGNOSIS — I10 HYPERTENSION GOAL BP (BLOOD PRESSURE) < 130/80: ICD-10-CM

## 2024-03-04 DIAGNOSIS — E11.9 TYPE 2 DIABETES MELLITUS WITHOUT COMPLICATION, WITH LONG-TERM CURRENT USE OF INSULIN (H): Primary | ICD-10-CM

## 2024-03-04 DIAGNOSIS — Z79.4 TYPE 2 DIABETES MELLITUS WITHOUT COMPLICATION, WITH LONG-TERM CURRENT USE OF INSULIN (H): ICD-10-CM

## 2024-03-04 PROBLEM — E66.01 MORBID OBESITY (H): Status: RESOLVED | Noted: 2022-08-04 | Resolved: 2024-03-04

## 2024-03-04 LAB
ANION GAP SERPL CALCULATED.3IONS-SCNC: 11 MMOL/L (ref 7–15)
BUN SERPL-MCNC: 12.9 MG/DL (ref 6–20)
CALCIUM SERPL-MCNC: 9.8 MG/DL (ref 8.6–10)
CHLORIDE SERPL-SCNC: 98 MMOL/L (ref 98–107)
CHOLEST SERPL-MCNC: 180 MG/DL
CREAT SERPL-MCNC: 0.81 MG/DL (ref 0.67–1.17)
CREAT UR-MCNC: 73.2 MG/DL
DEPRECATED HCO3 PLAS-SCNC: 27 MMOL/L (ref 22–29)
EGFRCR SERPLBLD CKD-EPI 2021: >90 ML/MIN/1.73M2
FASTING STATUS PATIENT QL REPORTED: YES
GLUCOSE SERPL-MCNC: 206 MG/DL (ref 70–99)
HBA1C MFR BLD: 9 % (ref 0–5.6)
HDLC SERPL-MCNC: 39 MG/DL
LDLC SERPL CALC-MCNC: 87 MG/DL
MICROALBUMIN UR-MCNC: <12 MG/L
MICROALBUMIN/CREAT UR: NORMAL MG/G{CREAT}
NONHDLC SERPL-MCNC: 141 MG/DL
POTASSIUM SERPL-SCNC: 4.3 MMOL/L (ref 3.4–5.3)
SODIUM SERPL-SCNC: 136 MMOL/L (ref 135–145)
TRIGL SERPL-MCNC: 270 MG/DL

## 2024-03-04 PROCEDURE — 80048 BASIC METABOLIC PNL TOTAL CA: CPT | Performed by: PHYSICIAN ASSISTANT

## 2024-03-04 PROCEDURE — 36415 COLL VENOUS BLD VENIPUNCTURE: CPT | Performed by: PHYSICIAN ASSISTANT

## 2024-03-04 PROCEDURE — 80061 LIPID PANEL: CPT | Performed by: PHYSICIAN ASSISTANT

## 2024-03-04 PROCEDURE — 82570 ASSAY OF URINE CREATININE: CPT | Performed by: PHYSICIAN ASSISTANT

## 2024-03-04 PROCEDURE — 99207 PR FOOT EXAM NO CHARGE: CPT | Performed by: PHYSICIAN ASSISTANT

## 2024-03-04 PROCEDURE — 82043 UR ALBUMIN QUANTITATIVE: CPT | Performed by: PHYSICIAN ASSISTANT

## 2024-03-04 PROCEDURE — 99214 OFFICE O/P EST MOD 30 MIN: CPT | Performed by: PHYSICIAN ASSISTANT

## 2024-03-04 PROCEDURE — 83036 HEMOGLOBIN GLYCOSYLATED A1C: CPT | Performed by: PHYSICIAN ASSISTANT

## 2024-03-04 RX ORDER — LISINOPRIL 40 MG/1
40 TABLET ORAL DAILY
Qty: 90 TABLET | Refills: 3 | Status: SHIPPED | OUTPATIENT
Start: 2024-03-04

## 2024-03-04 RX ORDER — LANCETS
EACH MISCELLANEOUS
Qty: 100 EACH | Refills: 6 | Status: SHIPPED | OUTPATIENT
Start: 2024-03-04

## 2024-03-04 RX ORDER — METFORMIN HCL 500 MG
TABLET, EXTENDED RELEASE 24 HR ORAL
Qty: 60 TABLET | Refills: 5 | Status: SHIPPED | OUTPATIENT
Start: 2024-03-04 | End: 2024-03-05

## 2024-03-04 ASSESSMENT — PAIN SCALES - GENERAL: PAINLEVEL: NO PAIN (0)

## 2024-03-04 NOTE — PROGRESS NOTES
Assessment & Plan       ICD-10-CM    1. Type 2 diabetes mellitus without complication, with long-term current use of insulin (H)  E11.9 blood glucose monitoring (NO BRAND SPECIFIED) meter device kit    Z79.4 blood glucose (NO BRAND SPECIFIED) test strip     thin (NO BRAND SPECIFIED) lancets     Hemoglobin A1c     Basic metabolic panel  (Ca, Cl, CO2, Creat, Gluc, K, Na, BUN)     Albumin Random Urine Quantitative with Creat Ratio     FOOT EXAM     Adult Diabetes Education  Referral     Hemoglobin A1c     Basic metabolic panel  (Ca, Cl, CO2, Creat, Gluc, K, Na, BUN)     Albumin Random Urine Quantitative with Creat Ratio      2. Hypertension goal BP (blood pressure) < 130/80  I10 lisinopril (ZESTRIL) 40 MG tablet      3. Hyperlipidemia LDL goal <100  E78.5 Lipid panel reflex to direct LDL Fasting     Lipid panel reflex to direct LDL Fasting          1. New diagnosis of diabetes after his last visit in September and unfortunately he has been unable to get in for a follow-up until now. He has lost weight which is great, but his A1c is up from 8.6 in September to 9.0 today. Will start metformin  mg daily with breakfast for 3 days then twice daily with meals thereafter. Will prescribe a glucose meter to check sugars 3 times daily and will place a referral to the diabetic educator. I discussed the importance of routine exercise at least 4-5 days per week for 30+ minutes along with eating 4-5 small, low carb meals daily. Will plan on recheck in 3 months.    2. Blood pressure remains elevated so will increase his lisinopril to 40 mg and he will work on the lifestyle changes as noted above.    3. Will recheck lipid panel today and if still elevated, will likely change to atorvastatin.    Follow-up in 3 months.         Clovis Santiago is a 58 year old, presenting for the following health issues:  Diabetes    History of Present Illness       Diabetes:   He presents for follow up of diabetes.    He is not  checking blood glucose.         He has no concerns regarding his diabetes at this time.   He is not experiencing numbness or burning in feet, excessive thirst, blurry vision, weight changes or redness, sores or blisters on feet.           He eats 2-3 servings of fruits and vegetables daily.He consumes 0 sweetened beverage(s) daily.   He is taking medications regularly.     He has a new diagnosis of diabetes at his visit for his annual exam in September but has not been able to follow-up since now. He has been working on a healthier diet with decrease portion sizes and has lost 15+ pounds. He still could be more active. He is not checking his blood pressure to often at home but is taking all of his medications as prescribed.       Review of Systems  Constitutional, HEENT, cardiovascular, pulmonary, gi and gu systems are negative, except as otherwise noted.      Objective    BP (!) 140/80   Pulse 87   Temp 97.6  F (36.4  C) (Temporal)   Resp 16   Wt 104.3 kg (230 lb)   SpO2 98%   BMI 32.89 kg/m    Body mass index is 32.89 kg/m .  Physical Exam   GENERAL: alert and no distress  RESP: lungs clear to auscultation - no rales, rhonchi or wheezes  CV: regular rate and rhythm, normal S1 S2, no S3 or S4, no murmur, click or rub, no peripheral edema  NEURO: Normal strength and tone, mentation intact and speech normal. Gait is stable.   PSYCH: mentation appears normal, affect normal/bright      Lab Results   Component Value Date    A1C 9.0 03/04/2024    A1C 8.6 09/21/2023    A1C 6.2 08/04/2022    A1C 5.5 09/30/2021    A1C 5.7 02/23/2021    A1C 5.8 12/06/2019    A1C 5.3 06/02/2015       Signed Electronically by: Henrry Hameed PA-C

## 2024-03-04 NOTE — PATIENT INSTRUCTIONS
Start checking glucose at least twice daily. Your goals for glucose are below.    Diabetes Goals    - A1C < 7%    - AM Fasting <120    - Before supper     - 2 hours after supper <180     Keep working on small, low carb, frequent meals with routine exercise.  Will have you see our diabetic educator.    Will increase lisinopril to 40 mg.  Monitor home blood pressures.    Follow-up in 3 months.

## 2024-03-05 RX ORDER — METFORMIN HCL 500 MG
TABLET, EXTENDED RELEASE 24 HR ORAL
Qty: 180 TABLET | Refills: 1 | Status: SHIPPED | OUTPATIENT
Start: 2024-03-05 | End: 2024-10-02

## 2024-04-19 ENCOUNTER — PATIENT OUTREACH (OUTPATIENT)
Dept: FAMILY MEDICINE | Facility: OTHER | Age: 59
End: 2024-04-19
Payer: COMMERCIAL

## 2024-04-19 NOTE — TELEPHONE ENCOUNTER
Patient Quality Outreach    Patient is due for the following:   Diabetes -  Diabetic Follow-Up Visit    Next Steps:   Patient is to follow up in 3 months, per last office visit on  3/4/24    Type of outreach:    Chart review performed, no outreach needed.      Questions for provider review:    None           Jonelle Garcia MA

## 2024-06-03 ENCOUNTER — TELEPHONE (OUTPATIENT)
Dept: FAMILY MEDICINE | Facility: OTHER | Age: 59
End: 2024-06-03

## 2024-06-03 ENCOUNTER — OFFICE VISIT (OUTPATIENT)
Dept: FAMILY MEDICINE | Facility: OTHER | Age: 59
End: 2024-06-03
Payer: COMMERCIAL

## 2024-06-03 VITALS
RESPIRATION RATE: 16 BRPM | HEART RATE: 89 BPM | TEMPERATURE: 97.6 F | OXYGEN SATURATION: 98 % | DIASTOLIC BLOOD PRESSURE: 64 MMHG | WEIGHT: 217.5 LBS | SYSTOLIC BLOOD PRESSURE: 130 MMHG | BODY MASS INDEX: 31.1 KG/M2

## 2024-06-03 DIAGNOSIS — Z79.4 TYPE 2 DIABETES MELLITUS WITHOUT COMPLICATION, WITH LONG-TERM CURRENT USE OF INSULIN (H): Primary | ICD-10-CM

## 2024-06-03 DIAGNOSIS — E11.9 TYPE 2 DIABETES MELLITUS WITHOUT COMPLICATION, WITH LONG-TERM CURRENT USE OF INSULIN (H): Primary | ICD-10-CM

## 2024-06-03 DIAGNOSIS — S61.210A LACERATION OF RIGHT INDEX FINGER WITHOUT FOREIGN BODY WITHOUT DAMAGE TO NAIL, INITIAL ENCOUNTER: ICD-10-CM

## 2024-06-03 DIAGNOSIS — I10 HYPERTENSION GOAL BP (BLOOD PRESSURE) < 130/80: ICD-10-CM

## 2024-06-03 LAB
ANION GAP SERPL CALCULATED.3IONS-SCNC: 9 MMOL/L (ref 7–15)
BUN SERPL-MCNC: 19.1 MG/DL (ref 6–20)
CALCIUM SERPL-MCNC: 9.7 MG/DL (ref 8.6–10)
CHLORIDE SERPL-SCNC: 99 MMOL/L (ref 98–107)
CREAT SERPL-MCNC: 0.9 MG/DL (ref 0.67–1.17)
DEPRECATED HCO3 PLAS-SCNC: 32 MMOL/L (ref 22–29)
EGFRCR SERPLBLD CKD-EPI 2021: >90 ML/MIN/1.73M2
GLUCOSE SERPL-MCNC: 140 MG/DL (ref 70–99)
HBA1C MFR BLD: 5.6 % (ref 0–5.6)
POTASSIUM SERPL-SCNC: 4.7 MMOL/L (ref 3.4–5.3)
SODIUM SERPL-SCNC: 140 MMOL/L (ref 135–145)

## 2024-06-03 PROCEDURE — 99214 OFFICE O/P EST MOD 30 MIN: CPT | Performed by: PHYSICIAN ASSISTANT

## 2024-06-03 PROCEDURE — 36415 COLL VENOUS BLD VENIPUNCTURE: CPT | Performed by: PHYSICIAN ASSISTANT

## 2024-06-03 PROCEDURE — 80048 BASIC METABOLIC PNL TOTAL CA: CPT | Performed by: PHYSICIAN ASSISTANT

## 2024-06-03 PROCEDURE — 83036 HEMOGLOBIN GLYCOSYLATED A1C: CPT | Performed by: PHYSICIAN ASSISTANT

## 2024-06-03 RX ORDER — LANCING DEVICE
EACH MISCELLANEOUS
Qty: 1 EACH | Refills: 0 | Status: SHIPPED | OUTPATIENT
Start: 2024-06-03

## 2024-06-03 ASSESSMENT — PAIN SCALES - GENERAL: PAINLEVEL: NO PAIN (0)

## 2024-06-03 NOTE — PROGRESS NOTES
Assessment & Plan       ICD-10-CM    1. Type 2 diabetes mellitus without complication, with long-term current use of insulin (H)  E11.9 Hemoglobin A1c    Z79.4 Hemoglobin A1c     blood glucose (NO BRAND SPECIFIED) lancing device      2. Hypertension goal BP (blood pressure) < 130/80  I10 Basic metabolic panel  (Ca, Cl, CO2, Creat, Gluc, K, Na, BUN)     Basic metabolic panel  (Ca, Cl, CO2, Creat, Gluc, K, Na, BUN)      3. Laceration of right index finger without foreign body without damage to nail, initial encounter  S61.210A           1. His A1c has significantly improved from 9.0 three months ago to 5.6 today which is fantastic. Will continue metformin and he will continue with a low carb diet but he was encouraged to eat more than 50 g of carbs per day. He should have at least 30-50 carbs per meal. He will continue to stay active and will plan on recheck in 6 months.    2. His blood pressure is improved on the 40 mg of lisinopril and he is losing weight as well.     3. Right pointer finger laceration is actually fairly deep but he is 3 days out so suturing not advised. I recommend he continue with antibiotic ointment for Vaseline along with a bandage. He will very likely lose the distal skin of the finger overlying the laceration but it should heal well. Monitor for signs of infection.    Follow-up in 6 months.       Clovis Santiago is a 59 year old, presenting for the following health issues:  Diabetes    History of Present Illness       Diabetes:   He presents for follow up of diabetes.  He is checking home blood glucose a few times a month.   He checks blood glucose before meals.  Blood glucose is never over 200 and never under 70.  When his blood glucose is low, the patient is asymptomatic for confusion, blurred vision, lethargy and reports not feeling dizzy, shaky, or weak.   He has no concerns regarding his diabetes at this time.   He is not experiencing numbness or burning in feet, excessive thirst,  blurry vision, weight changes or redness, sores or blisters on feet. The patient has not had a diabetic eye exam in the last 12 months.          He eats 2-3 servings of fruits and vegetables daily.He consumes 0 sweetened beverage(s) daily.He exercises with enough effort to increase his heart rate 20 to 29 minutes per day.  He exercises with enough effort to increase his heart rate 4 days per week.   He is taking medications regularly.     He has been tolerating metformin well and he states no matter when he checks his glucose, it is typically around 110. He has occasional numbness and tingling in his feet but states this is likely more stemming from his low back as chiropractic adjustments typically help with these symptoms. He is eating a low carb diet, sometimes only 50 g of carbs per day. He is losing weight and overall feels good.     He cut his right finger 3 days ago and states he has been keeping it covered and using antibiotic ointment.      Review of Systems  Constitutional, HEENT, cardiovascular, pulmonary, gi and gu systems are negative, except as otherwise noted.      Objective    /64   Pulse 89   Temp 97.6  F (36.4  C) (Temporal)   Resp 16   Wt 98.7 kg (217 lb 8 oz)   SpO2 98%   BMI 31.10 kg/m    Body mass index is 31.1 kg/m .  Physical Exam   GENERAL: alert and no distress  RESP: lungs clear to auscultation - no rales, rhonchi or wheezes  CV: regular rate and rhythm, normal S1 S2, no S3 or S4, no murmur, click or rub, no peripheral edema  NEURO: Normal strength and tone, mentation intact and speech normal. Gait is stable.   PSYCH: mentation appears normal, affect normal/bright  SKIN: Right distal pointer finger with at least a 2-3 mm deep laceration. Normal capillary refill with no signs of infection.       Lab Results   Component Value Date    A1C 5.6 06/03/2024    A1C 9.0 03/04/2024    A1C 8.6 09/21/2023    A1C 6.2 08/04/2022    A1C 5.5 09/30/2021    A1C 5.7 02/23/2021    A1C 5.8  12/06/2019    A1C 5.3 06/02/2015         Signed Electronically by: Henrry Hameed PA-C

## 2024-06-03 NOTE — PATIENT INSTRUCTIONS
Your A1c is down to 5.6 which is fantastic.    Continue current medications.    Follow-up in 6 months.

## 2024-06-03 NOTE — TELEPHONE ENCOUNTER
----- Message from Henrry Hameed PA-C sent at 6/3/2024  2:25 PM CDT -----  Jack,    Your kidney function and electrolytes are stable and glucose is at 140, much improved from previous. Thanks!    Adam

## 2024-07-24 ENCOUNTER — TELEPHONE (OUTPATIENT)
Dept: FAMILY MEDICINE | Facility: OTHER | Age: 59
End: 2024-07-24
Payer: COMMERCIAL

## 2024-07-24 NOTE — LETTER
July 24, 2024      Jack Lenz  [address]  [place] [address]            Jack Lenz,      A recent review of our records shows that your overdue for your Diabetic Eye Exam.  We recommend that you schedule this now so that we can stay up to date with your health.  Please have recent eye exam results faxed to our clinic at [phone] if completed within a year. Please schedule if not completed yet and have records faxed to us.           Sincerely,        Henrry Hameed PA-C/ Care Team

## 2024-07-24 NOTE — TELEPHONE ENCOUNTER
Patient Quality Outreach    Patient is due for the following:   Diabetes -  Eye Exam    Next Steps:   Schedule a office visit for Eye Exam    Type of outreach:    Sent letter.      Questions for provider review:    None           Orquidea Mccollum, CMA

## 2024-08-22 ENCOUNTER — PATIENT OUTREACH (OUTPATIENT)
Dept: CARE COORDINATION | Facility: CLINIC | Age: 59
End: 2024-08-22
Payer: COMMERCIAL

## 2024-09-05 ENCOUNTER — PATIENT OUTREACH (OUTPATIENT)
Dept: CARE COORDINATION | Facility: CLINIC | Age: 59
End: 2024-09-05
Payer: COMMERCIAL

## 2024-09-24 DIAGNOSIS — I10 HYPERTENSION GOAL BP (BLOOD PRESSURE) < 130/80: ICD-10-CM

## 2024-09-24 DIAGNOSIS — E78.5 HYPERLIPIDEMIA LDL GOAL <130: ICD-10-CM

## 2024-09-24 RX ORDER — SIMVASTATIN 20 MG
TABLET ORAL
Qty: 90 TABLET | Refills: 3 | Status: SHIPPED | OUTPATIENT
Start: 2024-09-24

## 2024-09-24 RX ORDER — HYDROCHLOROTHIAZIDE 25 MG/1
TABLET ORAL
Qty: 90 TABLET | Refills: 1 | Status: SHIPPED | OUTPATIENT
Start: 2024-09-24

## 2024-10-01 DIAGNOSIS — E11.9 TYPE 2 DIABETES MELLITUS WITHOUT COMPLICATION, WITH LONG-TERM CURRENT USE OF INSULIN (H): ICD-10-CM

## 2024-10-01 DIAGNOSIS — Z79.4 TYPE 2 DIABETES MELLITUS WITHOUT COMPLICATION, WITH LONG-TERM CURRENT USE OF INSULIN (H): ICD-10-CM

## 2024-10-02 RX ORDER — METFORMIN HYDROCHLORIDE 500 MG/1
TABLET, EXTENDED RELEASE ORAL
Qty: 180 TABLET | Refills: 1 | Status: SHIPPED | OUTPATIENT
Start: 2024-10-02

## 2024-12-02 ENCOUNTER — OFFICE VISIT (OUTPATIENT)
Dept: FAMILY MEDICINE | Facility: OTHER | Age: 59
End: 2024-12-02
Payer: COMMERCIAL

## 2024-12-02 VITALS
SYSTOLIC BLOOD PRESSURE: 126 MMHG | DIASTOLIC BLOOD PRESSURE: 78 MMHG | OXYGEN SATURATION: 97 % | TEMPERATURE: 96.6 F | RESPIRATION RATE: 18 BRPM | BODY MASS INDEX: 32.14 KG/M2 | WEIGHT: 224.5 LBS | HEIGHT: 70 IN | HEART RATE: 84 BPM

## 2024-12-02 DIAGNOSIS — I10 HYPERTENSION GOAL BP (BLOOD PRESSURE) < 130/80: ICD-10-CM

## 2024-12-02 DIAGNOSIS — E78.5 HYPERLIPIDEMIA LDL GOAL <130: ICD-10-CM

## 2024-12-02 DIAGNOSIS — Z79.4 TYPE 2 DIABETES MELLITUS WITHOUT COMPLICATION, WITH LONG-TERM CURRENT USE OF INSULIN (H): Primary | ICD-10-CM

## 2024-12-02 DIAGNOSIS — E11.9 TYPE 2 DIABETES MELLITUS WITHOUT COMPLICATION, WITH LONG-TERM CURRENT USE OF INSULIN (H): Primary | ICD-10-CM

## 2024-12-02 LAB
EST. AVERAGE GLUCOSE BLD GHB EST-MCNC: 123 MG/DL
HBA1C MFR BLD: 5.9 % (ref 0–5.6)

## 2024-12-02 PROCEDURE — G2211 COMPLEX E/M VISIT ADD ON: HCPCS | Performed by: PHYSICIAN ASSISTANT

## 2024-12-02 PROCEDURE — 36415 COLL VENOUS BLD VENIPUNCTURE: CPT | Performed by: PHYSICIAN ASSISTANT

## 2024-12-02 PROCEDURE — 99214 OFFICE O/P EST MOD 30 MIN: CPT | Performed by: PHYSICIAN ASSISTANT

## 2024-12-02 PROCEDURE — 83036 HEMOGLOBIN GLYCOSYLATED A1C: CPT | Performed by: PHYSICIAN ASSISTANT

## 2024-12-02 RX ORDER — METFORMIN HYDROCHLORIDE 500 MG/1
TABLET, EXTENDED RELEASE ORAL
Qty: 180 TABLET | Refills: 1 | Status: SHIPPED | OUTPATIENT
Start: 2024-12-02

## 2024-12-02 ASSESSMENT — PAIN SCALES - GENERAL: PAINLEVEL_OUTOF10: NO PAIN (0)

## 2024-12-02 NOTE — PATIENT INSTRUCTIONS
Take 2 metformin the mornings with breakfast.    Try to be more active.    Follow-up in 6 months.

## 2024-12-02 NOTE — PROGRESS NOTES
Assessment & Plan       ICD-10-CM    1. Type 2 diabetes mellitus without complication, with long-term current use of insulin (H)  E11.9 Hemoglobin A1c    Z79.4 Hemoglobin A1c     metFORMIN (GLUCOPHAGE XR) 500 MG 24 hr tablet      2. Hypertension goal BP (blood pressure) < 130/80  I10       3. Hyperlipidemia LDL goal <130  E78.5           1. His diabetic control remains excellent with an A1c of 5.9 today. He is not taking the metformin twice daily often so I recommend he just add an extra tablet at breakfast for a total of 1000 mg daily. Continue with a low carb diet and I recommend he get back into more routine exercise. His goal is to get down to 200 in the next 6 months. Recheck in 6 months.    2. Blood pressure is stable on lisinopril and hydrochlorothiazide.    3. Continue simvastatin.    The longitudinal plan of care for the diagnosis(es)/condition(s) as documented were addressed during this visit. Due to the added complexity in care, I will continue to support Jack in the subsequent management and with ongoing continuity of care.      He declines all vaccines today.       Clovis Santiago is a 59 year old, presenting for the following health issues:  Diabetes        12/2/2024     7:00 AM   Additional Questions   Roomed by Le BENITEZ   Accompanied by n/a         12/2/2024     7:00 AM   Patient Reported Additional Medications   Patient reports taking the following new medications n/a     History of Present Illness       Diabetes:   He presents for follow up of diabetes.  He is checking home blood glucose a few times a month.   He checks blood glucose before meals.  Blood glucose is never over 200 and never under 70.  When his blood glucose is low, the patient is asymptomatic for confusion, blurred vision, lethargy and reports not feeling dizzy, shaky, or weak.   He has no concerns regarding his diabetes at this time.   He is not experiencing numbness or burning in feet, excessive thirst, blurry vision, weight  "changes or redness, sores or blisters on feet. The patient has not had a diabetic eye exam in the last 12 months.          He eats 0-1 servings of fruits and vegetables daily.He consumes 0 sweetened beverage(s) daily.      He has put on some weight over the past 6 months but is still eating a low carb diet. He is not getting much routine exercise. He is typically only taking 1 metformin per day. His glucose typically never goes above 120.    Hyperlipidemia Follow-Up    Are you regularly taking any medication or supplement to lower your cholesterol?   Yes- Simvastatin 20mg Daily  Are you having muscle aches or other side effects that you think could be caused by your cholesterol lowering medication?  No    Hypertension Follow-up    Do you check your blood pressure regularly outside of the clinic? Yes   Are you following a low salt diet? No  Are your blood pressures ever more than 140 on the top number (systolic) OR more   than 90 on the bottom number (diastolic), for example 140/90? No    Review of Systems  Constitutional, HEENT, cardiovascular, pulmonary, gi and gu systems are negative, except as otherwise noted.      Objective    /78 (Cuff Size: Adult Large)   Pulse 84   Temp (!) 96.6  F (35.9  C) (Temporal)   Resp 18   Ht 1.784 m (5' 10.25\")   Wt 101.8 kg (224 lb 8 oz)   SpO2 97%   BMI 31.98 kg/m    Body mass index is 31.98 kg/m .  Physical Exam   GENERAL: alert and no distress  RESP: lungs clear to auscultation - no rales, rhonchi or wheezes  CV: regular rate and rhythm, normal S1 S2, no S3 or S4, no murmur, click or rub, no peripheral edema  NEURO: Normal strength and tone, mentation intact and speech normal. Gait is stable.   PSYCH: mentation appears normal, affect normal/bright      Lab Results   Component Value Date    A1C 5.9 12/02/2024    A1C 5.6 06/03/2024    A1C 9.0 03/04/2024    A1C 8.6 09/21/2023    A1C 6.2 08/04/2022    A1C 5.7 02/23/2021    A1C 5.8 12/06/2019    A1C 5.3 06/02/2015 "         Signed Electronically by: Henrry Hameed PA-C

## 2025-03-25 DIAGNOSIS — I10 HYPERTENSION GOAL BP (BLOOD PRESSURE) < 130/80: ICD-10-CM

## 2025-03-25 RX ORDER — LISINOPRIL 40 MG/1
40 TABLET ORAL DAILY
Qty: 90 TABLET | Refills: 1 | Status: SHIPPED | OUTPATIENT
Start: 2025-03-25

## 2025-03-25 RX ORDER — HYDROCHLOROTHIAZIDE 25 MG/1
25 TABLET ORAL
Qty: 90 TABLET | Refills: 1 | Status: SHIPPED | OUTPATIENT
Start: 2025-03-25

## 2025-07-31 ENCOUNTER — ORDERS ONLY (AUTO-RELEASED) (OUTPATIENT)
Dept: FAMILY MEDICINE | Facility: OTHER | Age: 60
End: 2025-07-31

## 2025-07-31 ENCOUNTER — OFFICE VISIT (OUTPATIENT)
Dept: FAMILY MEDICINE | Facility: OTHER | Age: 60
End: 2025-07-31
Payer: COMMERCIAL

## 2025-07-31 VITALS
SYSTOLIC BLOOD PRESSURE: 128 MMHG | WEIGHT: 228 LBS | HEIGHT: 70 IN | HEART RATE: 84 BPM | RESPIRATION RATE: 16 BRPM | DIASTOLIC BLOOD PRESSURE: 70 MMHG | BODY MASS INDEX: 32.64 KG/M2 | OXYGEN SATURATION: 97 % | TEMPERATURE: 97.3 F

## 2025-07-31 DIAGNOSIS — I10 HYPERTENSION GOAL BP (BLOOD PRESSURE) < 130/80: ICD-10-CM

## 2025-07-31 DIAGNOSIS — Z12.11 SCREEN FOR COLON CANCER: ICD-10-CM

## 2025-07-31 DIAGNOSIS — E78.5 HYPERLIPIDEMIA LDL GOAL <130: ICD-10-CM

## 2025-07-31 DIAGNOSIS — Z79.4 TYPE 2 DIABETES MELLITUS WITHOUT COMPLICATION, WITH LONG-TERM CURRENT USE OF INSULIN (H): ICD-10-CM

## 2025-07-31 DIAGNOSIS — Z12.5 SCREENING FOR PROSTATE CANCER: ICD-10-CM

## 2025-07-31 DIAGNOSIS — Z00.00 ENCOUNTER FOR ROUTINE ADULT HEALTH EXAMINATION WITHOUT ABNORMAL FINDINGS: Primary | ICD-10-CM

## 2025-07-31 DIAGNOSIS — E11.9 TYPE 2 DIABETES MELLITUS WITHOUT COMPLICATION, WITH LONG-TERM CURRENT USE OF INSULIN (H): ICD-10-CM

## 2025-07-31 DIAGNOSIS — H72.91 PERFORATION OF TYMPANIC MEMBRANE, RIGHT: ICD-10-CM

## 2025-07-31 LAB
ALBUMIN SERPL BCG-MCNC: 4.6 G/DL (ref 3.5–5.2)
ALP SERPL-CCNC: 74 U/L (ref 40–150)
ALT SERPL W P-5'-P-CCNC: 28 U/L (ref 0–70)
ANION GAP SERPL CALCULATED.3IONS-SCNC: 13 MMOL/L (ref 7–15)
AST SERPL W P-5'-P-CCNC: 19 U/L (ref 0–45)
BILIRUB SERPL-MCNC: 0.4 MG/DL
BUN SERPL-MCNC: 20.3 MG/DL (ref 8–23)
CALCIUM SERPL-MCNC: 9.7 MG/DL (ref 8.8–10.4)
CHLORIDE SERPL-SCNC: 101 MMOL/L (ref 98–107)
CHOLEST SERPL-MCNC: 206 MG/DL
CREAT SERPL-MCNC: 0.85 MG/DL (ref 0.67–1.17)
CREAT UR-MCNC: 99.6 MG/DL
EGFRCR SERPLBLD CKD-EPI 2021: >90 ML/MIN/1.73M2
EST. AVERAGE GLUCOSE BLD GHB EST-MCNC: 128 MG/DL
FASTING STATUS PATIENT QL REPORTED: NO
FASTING STATUS PATIENT QL REPORTED: NO
GLUCOSE SERPL-MCNC: 132 MG/DL (ref 70–99)
HBA1C MFR BLD: 6.1 % (ref 0–5.6)
HCO3 SERPL-SCNC: 26 MMOL/L (ref 22–29)
HDLC SERPL-MCNC: 48 MG/DL
LDLC SERPL CALC-MCNC: 94 MG/DL
MICROALBUMIN UR-MCNC: <12 MG/L
MICROALBUMIN/CREAT UR: NORMAL MG/G{CREAT}
NONHDLC SERPL-MCNC: 158 MG/DL
POTASSIUM SERPL-SCNC: 4.5 MMOL/L (ref 3.4–5.3)
PROT SERPL-MCNC: 7.1 G/DL (ref 6.4–8.3)
PSA SERPL DL<=0.01 NG/ML-MCNC: 2.55 NG/ML (ref 0–3.5)
SODIUM SERPL-SCNC: 140 MMOL/L (ref 135–145)
TRIGL SERPL-MCNC: 322 MG/DL

## 2025-07-31 RX ORDER — HYDROCHLOROTHIAZIDE 25 MG/1
25 TABLET ORAL
Qty: 90 TABLET | Refills: 3 | Status: SHIPPED | OUTPATIENT
Start: 2025-07-31

## 2025-07-31 RX ORDER — METFORMIN HYDROCHLORIDE 500 MG/1
TABLET, EXTENDED RELEASE ORAL
Qty: 180 TABLET | Refills: 1 | Status: SHIPPED | OUTPATIENT
Start: 2025-07-31

## 2025-07-31 RX ORDER — SIMVASTATIN 20 MG
TABLET ORAL
Qty: 90 TABLET | Refills: 3 | Status: SHIPPED | OUTPATIENT
Start: 2025-07-31

## 2025-07-31 RX ORDER — LISINOPRIL 40 MG/1
40 TABLET ORAL DAILY
Qty: 90 TABLET | Refills: 3 | Status: SHIPPED | OUTPATIENT
Start: 2025-07-31

## 2025-07-31 SDOH — HEALTH STABILITY: PHYSICAL HEALTH: ON AVERAGE, HOW MANY DAYS PER WEEK DO YOU ENGAGE IN MODERATE TO STRENUOUS EXERCISE (LIKE A BRISK WALK)?: 3 DAYS

## 2025-07-31 ASSESSMENT — PAIN SCALES - GENERAL: PAINLEVEL_OUTOF10: MILD PAIN (1)

## 2025-07-31 ASSESSMENT — SOCIAL DETERMINANTS OF HEALTH (SDOH): HOW OFTEN DO YOU GET TOGETHER WITH FRIENDS OR RELATIVES?: PATIENT DECLINED

## 2025-07-31 NOTE — PATIENT INSTRUCTIONS
"Patient Education   Preventive Care Advice   This is general advice we often give to help people stay healthy. Your care team may have specific advice just for you. Please talk to your care team about your own preventive care needs.  Lifestyle  Exercise at least 150 minutes each week (30 minutes a day, 5 days a week).  Do muscle strengthening activities 2 days a week. These help control your weight and prevent disease.  No smoking.  Wear sunscreen to prevent skin cancer.  Take time with family and friends.  Have your home tested for radon every 2 to 5 years. Radon is a colorless, odorless gas that can harm your lungs. To learn more, go to www.health.[facility].[place] and search for \"Radon in Homes.\"  Keep guns unloaded and locked up in a safe place like a safe or gun vault, or, use a gun lock and hide the keys. Always lock away bullets separately. To learn more, visit [place].gov and search for \"safe gun storage.\"  Nutrition  Eat 5 or more servings of fruits and vegetables each day.  Try wheat bread, brown rice and whole grain pasta (instead of white bread, rice, and pasta).  Get enough calcium and vitamin D. Check the label on foods and aim for 100% of the RDA (recommended daily allowance).  Regular exams  Have a dental exam and cleaning every 6 months.  Older adults: Ask your care team how often to have memory testing.  See your health care team every year to talk about:  Any changes in your health.  Any medicines your care team has prescribed.  Preventive care, family planning, and ways to prevent chronic diseases.  Shots (vaccines)   HPV shots (up to age 26), if you've never had them before.  Hepatitis B shots (up to age 59), if you've never had them before.  COVID-19 shot: Get this shot when it's due.  Flu shot: Get a flu shot every year.  Tetanus shot: Get a tetanus shot every 10 years.  Pneumococcal, hepatitis A, and RSV shots: Ask your care team if you need these based on your risk.  Shingles shot (for age 50 and " up).  General health tests  Diabetes screening:  Starting at age 35, Get screened for diabetes at least every 3 years.  If you are younger than age 35, ask your care team if you should be screened for diabetes.  Cholesterol test: At age 39, start having a cholesterol test every 5 years, or more often if advised.  Bone density scan (DEXA): At age 50, ask your care team if you should have this scan for osteoporosis (brittle bones).  Hepatitis C: Get tested at least once in your life.  Abdominal aortic aneurysm screening: Talk to your doctor about having this screening if you:  Have ever smoked; and  Are biologically male; and  Are between the ages of 65 and 75.  STIs (sexually transmitted infections)  Before age 24: Ask your care team if you should be screened for STIs.  After age 24: Get screened for STIs if you're at risk. You are at risk for STIs (including HIV) if:  You are sexually active with more than one person.  You don't use condoms every time.  You or a partner was diagnosed with a sexually transmitted infection.  If you are at risk for HIV, ask about PrEP medicine to prevent HIV.  Get tested for HIV at least once in your life, whether you are at risk for HIV or not.  Cancer screening tests  Cervical cancer screening: If you have a cervix, begin getting regular cervical cancer screening tests at age 21. Most people who have regular screenings with normal results can stop after age 65. Talk about this with your provider.  Breast cancer scan (mammogram): If you've ever had breasts, begin having regular mammograms starting at age 40. This is a scan to check for breast cancer.  Colon cancer screening: It is important to start screening for colon cancer at age 45.  Have a colonoscopy test every 10 years (or more often if you're at risk) Or, ask your provider about stool tests like a FIT test every year or Cologuard test every 3 years.  To learn more about your testing options, visit:  www.[employer]/732407.pdf.  For help making a decision, visit: alla.aniyah/kf17475.  Prostate cancer screening test: If you have a prostate and are age 55 to 69, ask your provider if you would benefit from a yearly prostate cancer screening test.  Lung cancer screening: If you are a current or former smoker age 50 to 80, ask your care team if ongoing lung cancer screenings are right for you.    For informational purposes only. Not to replace the advice of your health care provider. Copyright   2023 [facility]. All rights reserved. Clinically reviewed by the [facility] Transitions Program. "[employer] 608266 - REV 6/25.

## 2025-07-31 NOTE — PROGRESS NOTES
Preventive Care Visit  [facility]  Henrry Hameed PA-C, Family Medicine  Jul 31, 2025    Assessment & Plan       ICD-10-CM    1. Encounter for routine adult health examination without abnormal findings  Z00.00       2. Type 2 diabetes mellitus without complication, with long-term current use of insulin (H)  E11.9 Hemoglobin A1c    Z79.4 Albumin Random Urine Quantitative with Creat Ratio     FOOT EXAM     Comprehensive metabolic panel (BMP + Alb, Alk Phos, ALT, AST, Total. Bili, TP)     Hemoglobin A1c     Albumin Random Urine Quantitative with Creat Ratio     Comprehensive metabolic panel (BMP + Alb, Alk Phos, ALT, AST, Total. Bili, TP)     metFORMIN (GLUCOPHAGE XR) 500 MG 24 hr tablet      3. Hyperlipidemia LDL goal <130  E78.5 Lipid panel reflex to direct LDL Fasting     Lipid panel reflex to direct LDL Fasting     simvastatin (ZOCOR) 20 MG tablet      4. Hypertension goal BP (blood pressure) < 130/80  I10 Comprehensive metabolic panel (BMP + Alb, Alk Phos, ALT, AST, Total. Bili, TP)     Comprehensive metabolic panel (BMP + Alb, Alk Phos, ALT, AST, Total. Bili, TP)     hydrochlorothiazide (HYDRODIURIL) 25 MG tablet     lisinopril (ZESTRIL) 40 MG tablet      5. Perforation of tympanic membrane, right  H72.91       6. Screen for colon cancer  Z12.11 FLACO([employer])      7. Screening for prostate cancer  Z12.5 PSA, screen     PSA, screen          1. Annual exam completed. He declines vaccines.    2. His A1c is stable at 6.1. He has only been taking 1 metformin with breakfast but I recommend he try 2 to keep his glucose down and further help with weight loss. He is still avoiding most sugars and white flour. Continue with routine exercise and will recheck in 6 months.    3. Updated lipid panel ordered. Continue simvastatin.    4. Blood pressure is stable. Updated CMP ordered.    5. Small right TM perforation, likely chronic and it does not bother him. He had numerous tubes when he  "was younger. I recommend close monitoring and avoidance of water in his ears.    6. Updated Cologuard ordered for colon cancer screening.    7. PSA screening ordered.    The longitudinal plan of care for the diagnosis(es)/condition(s) as documented were addressed during this visit. Due to the added complexity in care, I will continue to support Jack in the subsequent management and with ongoing continuity of care.    Follow-up in 6 months.       Patient has been advised of split billing requirements and indicates understanding: Yes    BMI  Estimated body mass index is 32.86 kg/m  as calculated from the following:    Height as of this encounter: 1.774 m (5' 9.84\").    Weight as of this encounter: 103.4 kg (228 lb).   Weight management plan: Discussed healthy diet and exercise guidelines    Counseling  Appropriate preventive services were addressed with this patient via screening, questionnaire, or discussion as appropriate for fall prevention, nutrition, physical activity, Tobacco-use cessation, social engagement, weight loss and cognition.  Checklist reviewing preventive services available has been given to the patient.  Reviewed patient's diet, addressing concerns and/or questions.   He is at risk for lack of exercise and has been provided with information to increase physical activity for the benefit of his well-being.       Subjective   Jack is a 60 year old, presenting for the following:  Physical        7/31/2025     3:04 PM   Additional Questions   Roomed by Orquidea BRICENO    He is only taking 1 metformin per day. He denies any new concerns.    Advance Care Planning  Discussed advance care planning with patient; informed AVS has link to [employer].        7/31/2025   General Health   How would you rate your overall physical health? Good   Feel stress (tense, anxious, or unable to sleep) Patient declined         7/31/2025   Nutrition   Three or more servings of calcium each day? Yes   Diet: " Regular (no restrictions)   How many servings of fruit and vegetables per day? (!) 2-3   How many sweetened beverages each day? 0-1         7/31/2025   Exercise   Days per week of moderate/strenous exercise 3 days         7/31/2025   Social Factors   Frequency of gathering with friends or relatives Patient declined   Worry food won't last until get money to buy more Patient declined   Food not last or not have enough money for food? Patient declined   Do you have housing? (Housing is defined as stable permanent housing and does not include staying outside in a car, in a tent, in an abandoned building, in an overnight shelter, or couch-surfing.) Patient declined   Are you worried about losing your housing? Patient declined   Lack of transportation? Patient declined   Unable to get utilities (heat,electricity)? Patient declined         7/31/2025   Fall Risk   Fallen 2 or more times in the past year? No   Trouble with walking or balance? No          7/31/2025   Dental   Dentist two times every year? Yes       Today's PHQ-2 Score:       7/31/2025     3:00 PM   PHQ-2 ( 1999 [employer])   Q1: Little interest or pleasure in doing things 0   Q2: Feeling down, depressed or hopeless 0   PHQ-2 Score 0    Q1: Little interest or pleasure in doing things Not at all   Q2: Feeling down, depressed or hopeless Not at all   PHQ-2 Score 0       Patient-reported           7/31/2025   Substance Use   Alcohol more than 3/day or more than 7/wk No   Do you use any other substances recreationally? No     Social History     Tobacco Use    Smoking status: Never     Passive exposure: Never    Smokeless tobacco: Never   Vaping Use    Vaping status: Never Used   Substance Use Topics    Alcohol use: Yes     Comment: 3-5/week    Drug use: No         7/31/2025   STI Screening   New sexual partner(s) since last STI/HIV test? No   Last PSA:   PSA   Date Value Ref Range Status   11/06/2017 2.53 0 - 4 ug/L Final     Comment:     Assay Method:   "Chemiluminescence using Siemens Vista analyzer     Prostate Specific Antigen Screen   Date Value Ref Range Status   09/30/2021 2.42 0.00 - 4.00 ug/L Final     ASCVD Risk   The 10-year ASCVD risk score (Karel ROBERTS, et al., 2019) is: 18.5%    Values used to calculate the score:      Age: 60 years      Sex: Male      Is Non- : No      Diabetic: Yes      Tobacco smoker: No      Systolic Blood Pressure: 128 mmHg      Is BP treated: Yes      HDL Cholesterol: 39 mg/dL      Total Cholesterol: 180 mg/dL      Reviewed and updated as needed this visit by Provider   Tobacco  Allergies  Meds  Problems  Med Hx  Surg Hx  Fam Hx            Review of Systems  Constitutional, HEENT, cardiovascular, pulmonary, GI, , musculoskeletal, neuro, skin, endocrine and psych systems are negative, except as otherwise noted.     Objective    Exam  /70   Pulse 84   Temp 97.3  F (36.3  C) (Temporal)   Resp 16   Ht 1.774 m (5' 9.84\")   Wt 103.4 kg (228 lb)   SpO2 97%   BMI 32.86 kg/m     Estimated body mass index is 32.86 kg/m  as calculated from the following:    Height as of this encounter: 1.774 m (5' 9.84\").    Weight as of this encounter: 103.4 kg (228 lb).    Physical Exam  GENERAL: healthy, alert and no distress  EYES: Eyes grossly normal to inspection, PERRL and conjunctivae and sclerae normal  HENT: ear canals and left TM normal, right TM with tympanosclerosis and a small perforation at the 9 o'clock position, nose and mouth without ulcers or lesions  NECK: no adenopathy, no asymmetry, masses, or scars and thyroid normal to palpation  RESP: lungs clear to auscultation - no rales, rhonchi or wheezes  CV: regular rate and rhythm, normal S1 S2, no S3 or S4, no murmur, click or rub, no peripheral edema and peripheral pulses strong  ABDOMEN: soft, nontender, no hepatosplenomegaly, no masses and bowel sounds normal  MS: no gross musculoskeletal defects noted, no edema. FROM to all extremities. " No spinal tenderness.   SKIN: no suspicious lesions or rashes  NEURO: Normal strength and tone, mentation intact and speech normal. Cranial nerves II-XII are grossly intact. DTRs are 2+/4 throughout and symmetric. Gait is stable. .  PSYCH: mentation appears normal, affect normal/bright  Diabetic foot exam: normal DP and PT pulses, no trophic changes or ulcerative lesions, normal sensory exam, and normal monofilament exam    Lab Results   Component Value Date    A1C 6.1 07/31/2025    A1C 5.9 12/02/2024    A1C 5.6 06/03/2024    A1C 9.0 03/04/2024    A1C 8.6 09/21/2023    A1C 5.7 02/23/2021    A1C 5.8 12/06/2019    A1C 5.3 06/02/2015         Signed Electronically by: Henrry Hameed PA-C

## 2025-08-06 ENCOUNTER — PATIENT OUTREACH (OUTPATIENT)
Dept: CARE COORDINATION | Facility: CLINIC | Age: 60
End: 2025-08-06
Payer: COMMERCIAL

## 2025-08-07 DIAGNOSIS — Z12.11 COLON CANCER SCREENING: ICD-10-CM

## 2025-08-21 ENCOUNTER — ORDERS ONLY (AUTO-RELEASED) (OUTPATIENT)
Dept: URGENT CARE | Facility: CLINIC | Age: 60
End: 2025-08-21
Payer: COMMERCIAL

## 2025-08-21 DIAGNOSIS — Z12.11 COLON CANCER SCREENING: ICD-10-CM
